# Patient Record
Sex: MALE | Race: WHITE | Employment: OTHER | URBAN - METROPOLITAN AREA
[De-identification: names, ages, dates, MRNs, and addresses within clinical notes are randomized per-mention and may not be internally consistent; named-entity substitution may affect disease eponyms.]

---

## 2021-02-23 ENCOUNTER — APPOINTMENT (OUTPATIENT)
Dept: CT IMAGING | Age: 76
DRG: 138 | End: 2021-02-23
Payer: MEDICARE

## 2021-02-23 ENCOUNTER — HOSPITAL ENCOUNTER (INPATIENT)
Age: 76
LOS: 2 days | Discharge: HOME | DRG: 138 | End: 2021-02-25
Attending: EMERGENCY MEDICINE | Admitting: FAMILY MEDICINE
Payer: MEDICARE

## 2021-02-23 DIAGNOSIS — R55 SYNCOPE AND COLLAPSE: Primary | ICD-10-CM

## 2021-02-23 DIAGNOSIS — S01.511A LIP LACERATION, INITIAL ENCOUNTER: ICD-10-CM

## 2021-02-23 DIAGNOSIS — S00.83XA CONTUSION OF FACE, INITIAL ENCOUNTER: ICD-10-CM

## 2021-02-23 LAB
ALBUMIN SERPL-MCNC: 3.9 G/DL (ref 3.5–5.2)
ALP BLD-CCNC: 42 U/L (ref 40–129)
ALT SERPL-CCNC: 25 U/L (ref 0–40)
ANION GAP SERPL CALCULATED.3IONS-SCNC: 13 MMOL/L (ref 7–16)
AST SERPL-CCNC: 28 U/L (ref 0–39)
BASOPHILS ABSOLUTE: 0.07 E9/L (ref 0–0.2)
BASOPHILS RELATIVE PERCENT: 1.1 % (ref 0–2)
BILIRUB SERPL-MCNC: 0.8 MG/DL (ref 0–1.2)
BUN BLDV-MCNC: 20 MG/DL (ref 8–23)
CALCIUM SERPL-MCNC: 8.6 MG/DL (ref 8.6–10.2)
CHLORIDE BLD-SCNC: 103 MMOL/L (ref 98–107)
CO2: 22 MMOL/L (ref 22–29)
CREAT SERPL-MCNC: 1.2 MG/DL (ref 0.7–1.2)
D DIMER: 272 NG/ML DDU
EKG ATRIAL RATE: 67 BPM
EKG P AXIS: 42 DEGREES
EKG P-R INTERVAL: 196 MS
EKG Q-T INTERVAL: 434 MS
EKG QRS DURATION: 132 MS
EKG QTC CALCULATION (BAZETT): 458 MS
EKG R AXIS: 59 DEGREES
EKG T AXIS: 43 DEGREES
EKG VENTRICULAR RATE: 67 BPM
EOSINOPHILS ABSOLUTE: 0.06 E9/L (ref 0.05–0.5)
EOSINOPHILS RELATIVE PERCENT: 0.9 % (ref 0–6)
GFR AFRICAN AMERICAN: >60
GFR NON-AFRICAN AMERICAN: 59 ML/MIN/1.73
GLUCOSE BLD-MCNC: 149 MG/DL (ref 74–99)
HCT VFR BLD CALC: 39.8 % (ref 37–54)
HEMOGLOBIN: 12.9 G/DL (ref 12.5–16.5)
IMMATURE GRANULOCYTES #: 0.03 E9/L
IMMATURE GRANULOCYTES %: 0.5 % (ref 0–5)
LYMPHOCYTES ABSOLUTE: 1.34 E9/L (ref 1.5–4)
LYMPHOCYTES RELATIVE PERCENT: 21.2 % (ref 20–42)
MAGNESIUM: 1.7 MG/DL (ref 1.6–2.6)
MCH RBC QN AUTO: 30.3 PG (ref 26–35)
MCHC RBC AUTO-ENTMCNC: 32.4 % (ref 32–34.5)
MCV RBC AUTO: 93.4 FL (ref 80–99.9)
MONOCYTES ABSOLUTE: 0.31 E9/L (ref 0.1–0.95)
MONOCYTES RELATIVE PERCENT: 4.9 % (ref 2–12)
NEUTROPHILS ABSOLUTE: 4.51 E9/L (ref 1.8–7.3)
NEUTROPHILS RELATIVE PERCENT: 71.4 % (ref 43–80)
PDW BLD-RTO: 15.8 FL (ref 11.5–15)
PLATELET # BLD: 143 E9/L (ref 130–450)
PMV BLD AUTO: 9.9 FL (ref 7–12)
POTASSIUM SERPL-SCNC: 4.8 MMOL/L (ref 3.5–5)
RBC # BLD: 4.26 E12/L (ref 3.8–5.8)
SODIUM BLD-SCNC: 138 MMOL/L (ref 132–146)
TOTAL PROTEIN: 6.2 G/DL (ref 6.4–8.3)
TROPONIN: <0.01 NG/ML (ref 0–0.03)
WBC # BLD: 6.3 E9/L (ref 4.5–11.5)

## 2021-02-23 PROCEDURE — 99284 EMERGENCY DEPT VISIT MOD MDM: CPT

## 2021-02-23 PROCEDURE — 70486 CT MAXILLOFACIAL W/O DYE: CPT

## 2021-02-23 PROCEDURE — 90471 IMMUNIZATION ADMIN: CPT | Performed by: STUDENT IN AN ORGANIZED HEALTH CARE EDUCATION/TRAINING PROGRAM

## 2021-02-23 PROCEDURE — 0CQ00ZZ REPAIR UPPER LIP, OPEN APPROACH: ICD-10-PCS | Performed by: FAMILY MEDICINE

## 2021-02-23 PROCEDURE — 70450 CT HEAD/BRAIN W/O DYE: CPT

## 2021-02-23 PROCEDURE — 2580000003 HC RX 258: Performed by: RADIOLOGY

## 2021-02-23 PROCEDURE — 83735 ASSAY OF MAGNESIUM: CPT

## 2021-02-23 PROCEDURE — 84484 ASSAY OF TROPONIN QUANT: CPT

## 2021-02-23 PROCEDURE — 85378 FIBRIN DEGRADE SEMIQUANT: CPT

## 2021-02-23 PROCEDURE — 71275 CT ANGIOGRAPHY CHEST: CPT

## 2021-02-23 PROCEDURE — 6370000000 HC RX 637 (ALT 250 FOR IP): Performed by: STUDENT IN AN ORGANIZED HEALTH CARE EDUCATION/TRAINING PROGRAM

## 2021-02-23 PROCEDURE — 93010 ELECTROCARDIOGRAM REPORT: CPT | Performed by: INTERNAL MEDICINE

## 2021-02-23 PROCEDURE — 90715 TDAP VACCINE 7 YRS/> IM: CPT | Performed by: STUDENT IN AN ORGANIZED HEALTH CARE EDUCATION/TRAINING PROGRAM

## 2021-02-23 PROCEDURE — 6360000002 HC RX W HCPCS: Performed by: STUDENT IN AN ORGANIZED HEALTH CARE EDUCATION/TRAINING PROGRAM

## 2021-02-23 PROCEDURE — 85025 COMPLETE CBC W/AUTO DIFF WBC: CPT

## 2021-02-23 PROCEDURE — 6360000004 HC RX CONTRAST MEDICATION: Performed by: RADIOLOGY

## 2021-02-23 PROCEDURE — 93005 ELECTROCARDIOGRAM TRACING: CPT | Performed by: STUDENT IN AN ORGANIZED HEALTH CARE EDUCATION/TRAINING PROGRAM

## 2021-02-23 PROCEDURE — 2140000000 HC CCU INTERMEDIATE R&B

## 2021-02-23 PROCEDURE — 72125 CT NECK SPINE W/O DYE: CPT

## 2021-02-23 PROCEDURE — 80053 COMPREHEN METABOLIC PANEL: CPT

## 2021-02-23 RX ORDER — CEPHALEXIN 500 MG/1
500 CAPSULE ORAL 2 TIMES DAILY
Status: DISCONTINUED | OUTPATIENT
Start: 2021-02-23 | End: 2021-02-25 | Stop reason: HOSPADM

## 2021-02-23 RX ORDER — ATORVASTATIN CALCIUM 40 MG/1
40 TABLET, FILM COATED ORAL DAILY
COMMUNITY

## 2021-02-23 RX ORDER — ATORVASTATIN CALCIUM 10 MG/1
10 TABLET, FILM COATED ORAL NIGHTLY
Status: DISCONTINUED | OUTPATIENT
Start: 2021-02-23 | End: 2021-02-25 | Stop reason: HOSPADM

## 2021-02-23 RX ORDER — ASPIRIN 81 MG/1
81 TABLET ORAL DAILY
COMMUNITY

## 2021-02-23 RX ORDER — NEOMYCIN SULFATE, POLYMYXIN B SULFATE, BACITRACIN ZINC, HYDROCORTISONE 3.5; 10000; 400; 1 MG/G; [USP'U]/G; [USP'U]/G; MG/G
OINTMENT OPHTHALMIC 2 TIMES DAILY
Status: DISCONTINUED | OUTPATIENT
Start: 2021-02-23 | End: 2021-02-25 | Stop reason: HOSPADM

## 2021-02-23 RX ORDER — SODIUM CHLORIDE 0.9 % (FLUSH) 0.9 %
10 SYRINGE (ML) INJECTION ONCE
Status: COMPLETED | OUTPATIENT
Start: 2021-02-23 | End: 2021-02-23

## 2021-02-23 RX ADMIN — IOPAMIDOL 70 ML: 755 INJECTION, SOLUTION INTRAVENOUS at 14:27

## 2021-02-23 RX ADMIN — Medication 10 ML: at 14:28

## 2021-02-23 RX ADMIN — CEPHALEXIN 500 MG: 500 CAPSULE ORAL at 17:42

## 2021-02-23 RX ADMIN — TETANUS TOXOID, REDUCED DIPHTHERIA TOXOID AND ACELLULAR PERTUSSIS VACCINE, ADSORBED 0.5 ML: 5; 2.5; 8; 8; 2.5 SUSPENSION INTRAMUSCULAR at 12:41

## 2021-02-23 ASSESSMENT — PAIN DESCRIPTION - LOCATION: LOCATION: MOUTH

## 2021-02-23 ASSESSMENT — PAIN SCALES - GENERAL
PAINLEVEL_OUTOF10: 0
PAINLEVEL_OUTOF10: 0

## 2021-02-23 NOTE — PROGRESS NOTES
Patient's hearing aid removed and placed in an envelope. Envelope given to patient. Patient left CT department with hearing aid.

## 2021-02-23 NOTE — PROCEDURES
Pedrito Shepard is a 70 y.o. male patient. 1. Syncope and collapse    2. Lip laceration, initial encounter    3. Contusion of face, initial encounter      Past Medical History:   Diagnosis Date    Hyperlipidemia     Prostate cancer ([address]      Blood pressure 106/62, pulse 62, temperature 97.6 °F (36.4 °C), resp. rate 13, SpO2 93 %. Procedures    Laceration Repair Procedure Note    Indication: Lip Laceration    Procedure: The patient was placed in the appropriate position and anesthesia around the laceration was obtained by infiltration using 1% Lidocaine with epinephrine. The area was then cleansed with betadine and draped in a sterile fashion. The laceration was closed with 5-0 fast absorbing gut. There were no additional lacerations requiring repair. Wound extended into the subcutaneous tissue. Minimal debridment was preformed, flaps were aligned. No foreign body was identified. Total repaired wound length: 8 cm. Other Items: Suture count: 3    The patient tolerated the procedure well.     Complications: None    Dr. Baldemar Webster was available for the entire procedure    Urbano Connelly DO  2/23/2021

## 2021-02-23 NOTE — ED NOTES
Bed: 20  Expected date:   Expected time:   Means of arrival:   Comments:     Annamarie Bass RN  02/23/21 3665

## 2021-02-23 NOTE — CONSULTS
GENERAL SURGERY  CONSULT NOTE  2/23/2021    Physician Consulted: Dr. Vijaya Christine  Reason for Consult: Lip Laceration    CC: Syncope    HPI  Dotty Hill is a 70 y.o. male who presents for evaluation after syncopal fall where he hit his upper lip against a computer stand. Patient sustained circular laceration to the superior medial oral labia with an [place] of tissue present. The [place] of tissue appears viable. The wound was not actively hemorrhaging. No debris was appreciated within the wound. Past Medical History:   Diagnosis Date    Hyperlipidemia     Prostate cancer [facility])        History reviewed. No pertinent surgical history. Medications Prior to Admission:    Prior to Admission medications    Medication Sig Start Date End Date Taking? Authorizing Provider   aspirin 81 MG EC tablet Take 81 mg by mouth daily   Yes Historical Provider, MD   atorvastatin (LIPITOR) 40 MG tablet Take 40 mg by mouth daily   Yes Historical Provider, MD       No Known Allergies    History reviewed. No pertinent family history. Social History     Tobacco Use    Smoking status: Never Smoker   Substance Use Topics    Alcohol use: No    Drug use: Not on file         Review of Systems   General ROS: New Syncope  Hematological and Lymphatic ROS: negative for - Home anticoagulation  Respiratory ROS: negative  Cardiovascular ROS: New syncope  Gastrointestinal ROS: negative  Genito-Urinary ROS: negative  Musculoskeletal ROS: negative      PHYSICAL EXAM:    Vitals:    02/23/21 1402   BP: 106/62   Pulse: 62   Resp: 13   Temp:    SpO2: 93%       General Appearance:  awake, alert, oriented, in no acute distress  Skin:  circular laceration to the superior medial oral labia with an [place] of tissue present. The [place] of tissue appears viable. The wound was not actively hemorrhaging. No debris was appreciated within the wound. Head/face:  NCAT  Eyes:  No gross abnormalities.   Lungs:  Breathing Pattern: regular, no distress  Heart: Heart regular rate and hypotensive  Abdomen:  Soft, non-tender,non distended  Extremities: Extremities warm to touch, pink, with no edema. LABS:    CBC  Recent Labs     02/23/21  1232   WBC 6.3   HGB 12.9   HCT 39.8        BMP  Recent Labs     02/23/21  1232      K 4.8      CO2 22   BUN 20   CREATININE 1.2   CALCIUM 8.6     Liver Function  Recent Labs     02/23/21  1232   BILITOT 0.8   AST 28   ALT 25   ALKPHOS 42   PROT 6.2*   LABALBU 3.9     No results for input(s): LACTATE in the last 72 hours. No results for input(s): INR, PTT in the last 72 hours. Invalid input(s): PT    RADIOLOGY    Ct Head Wo Contrast    Result Date: 2/23/2021  EXAMINATION: CT OF THE HEAD WITHOUT CONTRAST  2/23/2021 2:25 pm TECHNIQUE: CT of the head was performed without the administration of intravenous contrast. Dose modulation, iterative reconstruction, and/or weight based adjustment of the mA/kV was utilized to reduce the radiation dose to as low as reasonably achievable. COMPARISON: None. HISTORY: ORDERING SYSTEM PROVIDED HISTORY: fall TECHNOLOGIST PROVIDED HISTORY: Reason for exam:->fall Has a \"code stroke\" or \"stroke alert\" been called? ->No Decision Support Exception->Emergency Medical Condition ([place]) What reading provider will be dictating this exam?->CRC FINDINGS: BRAIN/VENTRICLES: There is no acute intracranial hemorrhage, mass effect or midline shift. No abnormal extra-axial fluid collection. The gray-white differentiation is maintained without evidence of an acute infarct. There is no evidence of hydrocephalus. The ventricles, cisterns and sulci are prominent consistent with atrophy. There is decreased attenuation within the periventricular white matter consistent with periventricular leukomalacia. ORBITS: The visualized portion of the orbits demonstrate no acute abnormality. SINUSES: The visualized paranasal sinuses and mastoid air cells demonstrate no acute abnormality.  SOFT TISSUES/SKULL:  No acute abnormality of the visualized skull or soft tissues. 1. There is no acute intracranial abnormality. Specifically, there is no intracranial hemorrhage. 2. Atrophy and periventricular leukomalacia,    Ct Facial Bones Wo Contrast    Result Date: 2/23/2021  EXAMINATION: CT OF THE FACE WITHOUT CONTRAST  2/23/2021 2:25 pm TECHNIQUE: CT of the face was performed without the administration of intravenous contrast. Multiplanar reformatted images are provided for review. Dose modulation, iterative reconstruction, and/or weight based adjustment of the mA/kV was utilized to reduce the radiation dose to as low as reasonably achievable. COMPARISON: None HISTORY: ORDERING SYSTEM PROVIDED HISTORY: fall TECHNOLOGIST PROVIDED HISTORY: Reason for exam:->fall Decision Support Exception->Emergency Medical Condition (MA) What reading provider will be dictating this exam?->CRC FINDINGS: FACIAL BONES:  The maxilla, pterygoid plates and zygomatic arches are intact. The mandible is intact. The mandibular condyles are normally situated. The nasal bones and maxillary nasal processes are intact. ORBITS:  The globes appear intact. The extraocular muscles, optic nerve sheath complexes and lacrimal glands appear unremarkable. No retrobulbar hematoma or mass is seen. The orbital walls and rims are intact. SINUSES/MASTOIDS:  The paranasal sinuses and mastoid air cells are well aerated. No acute fracture is seen. SOFT TISSUES:  No appreciable facial soft tissue swelling is seen. No acute traumatic injury of the facial bones. Ct Cervical Spine Wo Contrast    Result Date: 2/23/2021  EXAMINATION: CT OF THE CERVICAL SPINE WITHOUT CONTRAST 2/23/2021 2:25 pm TECHNIQUE: CT of the cervical spine was performed without the administration of intravenous contrast. Multiplanar reformatted images are provided for review.  Dose modulation, iterative reconstruction, and/or weight based adjustment of the mA/kV was utilized to reduce the radiation dose to as low as reasonably achievable. COMPARISON: None. HISTORY: ORDERING SYSTEM PROVIDED HISTORY: fall TECHNOLOGIST PROVIDED HISTORY: Reason for exam:->fall Decision Support Exception->Emergency Medical Condition (MA) What reading provider will be dictating this exam?->CRC FINDINGS: BONES/ALIGNMENT: The ring of C1 is intact as is the dense. There is no compression fracture of the cervical spine. No jumped or perched facet is noted. Multilevel degenerative disc and degenerative joint disease is noted. The prevertebral soft tissues are unremarkable. The airway is widely patent. Images through the lung apices are negative for a pneumothorax. 1. There is no acute compression fracture or subluxation of the cervical spine. 2. Multilevel degenerative disc and degenerative joint disease. Cta Pulmonary W Contrast    Result Date: 2/23/2021  EXAMINATION: CTA OF THE CHEST 2/23/2021 2:25 pm TECHNIQUE: CTA of the chest was performed after the administration of intravenous contrast.  Multiplanar reformatted images are provided for review. MIP images are provided for review. Dose modulation, iterative reconstruction, and/or weight based adjustment of the mA/kV was utilized to reduce the radiation dose to as low as reasonably achievable. COMPARISON: None. HISTORY: ORDERING SYSTEM PROVIDED HISTORY: concern for pe TECHNOLOGIST PROVIDED HISTORY: Reason for exam:->concern for pe Decision Support Exception->Emergency Medical Condition (MA) What reading provider will be dictating this exam?->CRC FINDINGS: Pulmonary Arteries: Pulmonary arteries are adequately opacified for evaluation. No evidence of intraluminal filling defect to suggest pulmonary embolism. Main pulmonary artery is normal in caliber. Mediastinum: No evidence of mediastinal lymphadenopathy. There is a small calcified right and left hilar nodes. The heart and pericardium demonstrate no acute abnormality. There is no acute abnormality of the thoracic aorta. Lungs/pleura: There is a small amount of bibasilar atelectasis. There is no focal consolidation or pulmonary edema. No evidence of pleural effusion or pneumothorax. Upper Abdomen: Limited images of the upper abdomen are unremarkable. Soft Tissues/Bones: No acute bone or soft tissue abnormality. No evidence of pulmonary embolism or significant acute pulmonary abnormality.         ASSESSMENT:  70 y.o. male with superior labial laceration involving the vermilion border    PLAN:    -Primary repair with 5-0 Fast absorbing gut  -Keflex 5 days  -Outpatient follow up with Dr. Aaliyah Thompson this next Wednesday 3/3/21    Plan discussed with OMID Bledsoe    Electronically signed by Love Davis DO on 2/23/21 at 5:06 PM EST

## 2021-02-23 NOTE — ED PROVIDER NOTES
HPI:     Silvia Villafuerte is a 70 y.o. male presenting to the ED for consciousness, beginning 2 hours ago. The complaint has been intermittent, moderate in severity, and worsened by nothing. Patient presents the ER today after a syncopal event at home. Patient states that he was walking to the bathroom when he was consciousness. He is unsure as to why or how. States that he woke up on the floor. States that he did hit the front of his face and cut his lip. Per EMS, patient was hypotensive and bradycardic with a rapid heart rate in the 30s. Did give some atropine which did increase patient's heart rate. At this time, patient states that he feels his normal self. Denies any headache, dizziness, fever, chest pain, cough, nausea, vomiting, abdominal pain, diarrhea, constipation, urinary symptoms. Review of Systems:   Please see HPI above. All bolded are positive. All un-bolded are negative.     Constitutional Symptoms: fever, chills, fatigue, generalized weakness, diaphoresis, increase in thirst, loss of appetite  Eyes: vision change   Ears, Nose, Mouth, Throat: hearing loss, nasal congestion, sores in the mouth, lip laceration, facial contusion  Cardiovascular: chest pain, chest heaviness, palpitations  Respiratory: shortness of breath, wheezing, coughing  Gastrointestinal: abdominal pain, nausea, vomiting, diarrhea, constipation, melena, hematochezia, hematemesis  Genitourinary: dysuria, hematuria, increased frequency  Musculoskeletal: lower extremity edema, myalgias, arthralgias, back pain  Integumentary: rashes, itching   Neurological: headache, lightheadedness, dizziness, confusion, syncope, numbness, tingling, focal weakness  Psychiatric: depression, suicidal ideation, anxiety  Endocrine: unintentional weight change  Hematologic/Lymphatic: lymphadenopathy, easy bruising, easy bleeding   Allergic/Immunologic: recurrent infections            --------------------------------------------- PAST HISTORY ---------------------------------------------  Past Medical History:  has a past medical history of Hyperlipidemia and Prostate cancer ([facility] [address] Past Surgical History:  has no past surgical history on file. Social History:  reports that he has never smoked. He does not have any smokeless tobacco history on file. He reports that he does not drink alcohol. Family History: family history is not on file. The patients home medications have been reviewed. Allergies: Patient has no known allergies.     -------------------------------------------------- RESULTS -------------------------------------------------  All laboratory and radiology results have been personally reviewed by myself   LABS:  Results for orders placed or performed during the hospital encounter of 02/23/21   CBC auto differential   Result Value Ref Range    WBC 6.3 4.5 - 11.5 E9/L    RBC 4.26 3.80 - 5.80 E12/L    Hemoglobin 12.9 12.5 - 16.5 g/dL    Hematocrit 39.8 37.0 - 54.0 %    MCV 93.4 80.0 - 99.9 fL    MCH 30.3 26.0 - 35.0 pg    MCHC 32.4 32.0 - 34.5 %    RDW 15.8 (H) 11.5 - 15.0 fL    Platelets 688 669 - 392 E9/L    MPV 9.9 7.0 - 12.0 fL    Neutrophils % 71.4 43.0 - 80.0 %    Immature Granulocytes % 0.5 0.0 - 5.0 %    Lymphocytes % 21.2 20.0 - 42.0 %    Monocytes % 4.9 2.0 - 12.0 %    Eosinophils % 0.9 0.0 - 6.0 %    Basophils % 1.1 0.0 - 2.0 %    Neutrophils Absolute 4.51 1.80 - 7.30 E9/L    Immature Granulocytes # 0.03 E9/L    Lymphocytes Absolute 1.34 (L) 1.50 - 4.00 E9/L    Monocytes Absolute 0.31 0.10 - 0.95 E9/L    Eosinophils Absolute 0.06 0.05 - 0.50 E9/L    Basophils Absolute 0.07 0.00 - 0.20 E9/L   Comprehensive Metabolic Panel   Result Value Ref Range    Sodium 138 132 - 146 mmol/L    Potassium 4.8 3.5 - 5.0 mmol/L    Chloride 103 98 - 107 mmol/L    CO2 22 22 - 29 mmol/L    Anion Gap 13 7 - 16 mmol/L    Glucose 149 (H) 74 - 99 mg/dL    BUN 20 8 - 23 mg/dL    CREATININE 1.2 0.7 - 1.2 mg/dL    GFR Non-African American 59 >=60 mL/min/1.73    GFR African American >60     Calcium 8.6 8.6 - 10.2 mg/dL    Total Protein 6.2 (L) 6.4 - 8.3 g/dL    Albumin 3.9 3.5 - 5.2 g/dL    Total Bilirubin 0.8 0.0 - 1.2 mg/dL    Alkaline Phosphatase 42 40 - 129 U/L    ALT 25 0 - 40 U/L    AST 28 0 - 39 U/L   Troponin   Result Value Ref Range    Troponin <0.01 0.00 - 0.03 ng/mL   D-Dimer, Quantitative   Result Value Ref Range    D-Dimer, Quant 272 ng/mL DDU   Magnesium   Result Value Ref Range    Magnesium 1.7 1.6 - 2.6 mg/dL   EKG 12 Lead   Result Value Ref Range    Ventricular Rate 67 BPM    Atrial Rate 67 BPM    P-R Interval 196 ms    QRS Duration 132 ms    Q-T Interval 434 ms    QTc Calculation (Bazett) 458 ms    P Axis 42 degrees    R Axis 59 degrees    T Axis 43 degrees       RADIOLOGY:  Interpreted by Radiologist.  CT HEAD WO CONTRAST   Final Result   1. There is no acute intracranial abnormality. Specifically, there is no   intracranial hemorrhage. 2. Atrophy and periventricular leukomalacia,      CT FACIAL BONES WO CONTRAST   Final Result   No acute traumatic injury of the facial bones. CT CERVICAL SPINE WO CONTRAST   Final Result   1. There is no acute compression fracture or subluxation of the cervical   spine. 2. Multilevel degenerative disc and degenerative joint disease. CTA PULMONARY W CONTRAST   Final Result   No evidence of pulmonary embolism or significant acute pulmonary abnormality.          ------------------------- NURSING NOTES AND VITALS REVIEWED ---------------------------   The nursing notes within the ED encounter and vital signs as below have been reviewed.    /62   Pulse 62   Temp 97.6 °F (36.4 °C)   Resp 13   SpO2 93%   Oxygen Saturation Interpretation: Normal      ---------------------------------------------------PHYSICAL EXAM--------------------------------------      Constitutional/General: Alert and oriented x3, well appearing, non toxic in NAD  Head: Normocephalic and atraumatic, small abrasion noted to patient's right cheek  Eyes: PERRL, EOMI  Mouth: Oropharynx clear, handling secretions, no trismus, slight laceration noted through frenulum and through superior vermilion border of the lip  Neck: Supple, full ROM, phonation normal  Pulmonary: Lungs clear to auscultation bilaterally, no wheezes, rales, or rhonchi. Not in respiratory distress  Cardiovascular:  Regular rate and rhythm, no murmurs, gallops, or rubs. 2+ distal pulses  Abdomen: Soft, non tender, non distended,   Extremities: Moves all extremities x 4. Warm and well perfused  Skin: warm and dry without rash  Neurologic: GCS 15, no focal deficit noted  Psych: Normal Affect        EKG: This EKG is signed and interpreted by me. Rate: 67  Rhythm: Sinus  Interpretation: Normal sinus pain, right bundle branch block, no acute changes, no axis deviation  Comparison: no previous EKG    ------------------------------ ED COURSE/MEDICAL DECISION MAKING----------------------  Medications   Tetanus-Diphth-Acell Pertussis (BOOSTRIX) injection 0.5 mL (0.5 mLs Intramuscular Given 2/23/21 1241)   sodium chloride flush 0.9 % injection 10 mL (10 mLs Intravenous Given 2/23/21 1428)   iopamidol (ISOVUE-370) 76 % injection 70 mL (70 mLs Intravenous Given 2/23/21 1427)         ED COURSE:       Medical Decision Making:    Patient presented to the ER today after a fall. Patient is longer hypotensive in the ER. Patient does have a laceration to the vermilion border and I did speak to plastic surgery. At this time, patient is well-appearing, however given that he had a syncopal event and was hypotensive and bradycardic prior to arrival I will have admitted for further evaluation. I did speak to Dr. Kailee Jones who is agreeable to mission. Counseling: The emergency provider has spoken with the patient and discussed todays results, in addition to providing specific details for the plan of care and counseling regarding the diagnosis and prognosis.   Questions are answered at

## 2021-02-24 LAB
ANION GAP SERPL CALCULATED.3IONS-SCNC: 7 MMOL/L (ref 7–16)
BUN BLDV-MCNC: 15 MG/DL (ref 8–23)
CALCIUM SERPL-MCNC: 8.8 MG/DL (ref 8.6–10.2)
CHLORIDE BLD-SCNC: 104 MMOL/L (ref 98–107)
CO2: 29 MMOL/L (ref 22–29)
CREAT SERPL-MCNC: 1.2 MG/DL (ref 0.7–1.2)
GFR AFRICAN AMERICAN: >60
GFR NON-AFRICAN AMERICAN: 59 ML/MIN/1.73
GLUCOSE BLD-MCNC: 107 MG/DL (ref 74–99)
HCT VFR BLD CALC: 39.5 % (ref 37–54)
HEMOGLOBIN: 13.2 G/DL (ref 12.5–16.5)
MCH RBC QN AUTO: 31.2 PG (ref 26–35)
MCHC RBC AUTO-ENTMCNC: 33.4 % (ref 32–34.5)
MCV RBC AUTO: 93.4 FL (ref 80–99.9)
PDW BLD-RTO: 16 FL (ref 11.5–15)
PLATELET # BLD: 143 E9/L (ref 130–450)
PMV BLD AUTO: 9.7 FL (ref 7–12)
POTASSIUM SERPL-SCNC: 4.4 MMOL/L (ref 3.5–5)
RBC # BLD: 4.23 E12/L (ref 3.8–5.8)
SODIUM BLD-SCNC: 140 MMOL/L (ref 132–146)
WBC # BLD: 6.6 E9/L (ref 4.5–11.5)

## 2021-02-24 PROCEDURE — 2140000000 HC CCU INTERMEDIATE R&B

## 2021-02-24 PROCEDURE — 99223 1ST HOSP IP/OBS HIGH 75: CPT | Performed by: SPECIALIST

## 2021-02-24 PROCEDURE — 36415 COLL VENOUS BLD VENIPUNCTURE: CPT

## 2021-02-24 PROCEDURE — 80048 BASIC METABOLIC PNL TOTAL CA: CPT

## 2021-02-24 PROCEDURE — 6370000000 HC RX 637 (ALT 250 FOR IP): Performed by: STUDENT IN AN ORGANIZED HEALTH CARE EDUCATION/TRAINING PROGRAM

## 2021-02-24 PROCEDURE — 6360000002 HC RX W HCPCS: Performed by: FAMILY MEDICINE

## 2021-02-24 PROCEDURE — 85027 COMPLETE CBC AUTOMATED: CPT

## 2021-02-24 RX ADMIN — ENOXAPARIN SODIUM 40 MG: 40 INJECTION SUBCUTANEOUS at 08:22

## 2021-02-24 RX ADMIN — CEPHALEXIN 500 MG: 500 CAPSULE ORAL at 08:22

## 2021-02-24 RX ADMIN — NEOMYCIN SULFATE AND POLYMYXIN B SULFATE, BACITRACIN ZINC AND HYDROCORTISONE: 3.5; 10000; 400; 1 OINTMENT OPHTHALMIC at 20:26

## 2021-02-24 RX ADMIN — CEPHALEXIN 500 MG: 500 CAPSULE ORAL at 20:25

## 2021-02-24 RX ADMIN — NEOMYCIN SULFATE AND POLYMYXIN B SULFATE, BACITRACIN ZINC AND HYDROCORTISONE: 3.5; 10000; 400; 1 OINTMENT OPHTHALMIC at 11:51

## 2021-02-24 ASSESSMENT — PAIN SCALES - GENERAL
PAINLEVEL_OUTOF10: 0

## 2021-02-24 ASSESSMENT — ENCOUNTER SYMPTOMS
SHORTNESS OF BREATH: 0
ABDOMINAL PAIN: 0

## 2021-02-24 NOTE — CARE COORDINATION
Transition of care: EP cardio consulted - 2d echo and stress test ordered. Plastic surgery consulted for lip laceration. Met with pt and pt's wife, Eyal Gonzalez, in room. Pt lives with his wife in a 2 story home. Independent with ADLs and drives. No DME. Not a . Discharge plan is to return home. No needs anticipated. PCP is Dr. Jean Marie Orozco and pharmacy is [facility] in [facility].  Sw/cm will follow

## 2021-02-24 NOTE — H&P
HISTORY AND PHYSICAL             Date: 2/24/2021        Patient Name: Dotty Hill     YOB: 1945      Age:  70 y.o. Chief Complaint     Chief Complaint   Patient presents with    Loss of Consciousness     syncopal episode at home, hit head and upper lip, pts HR in 30s for EMS with hypotension, 1mg of atropine, 4mg zofran given pta, laceration to upper lip          History Obtained From   patient    History of Present Illness   Patient says he was standing in the bathroom and then lost consciousness and fell to the floor hitting his face. He says he feels fine today. Past Medical History     Past Medical History:   Diagnosis Date    Hyperlipidemia     Prostate cancer [facility])         Past Surgical History   History reviewed. No pertinent surgical history. Medications Prior to Admission     Prior to Admission medications    Medication Sig Start Date End Date Taking? Authorizing Provider   aspirin 81 MG EC tablet Take 81 mg by mouth daily   Yes Historical Provider, MD   atorvastatin (LIPITOR) 40 MG tablet Take 40 mg by mouth daily   Yes Historical Provider, MD        Allergies   Patient has no known allergies. Social History     Social History     Tobacco History     Smoking Status  Never Smoker    Smokeless Tobacco Use  Unknown          Alcohol History     Alcohol Use Status  No          Drug Use     Drug Use Status  Not Asked          Sexual Activity     Sexually Active  Not Asked                Family History   History reviewed. No pertinent family history. Review of Systems   Review of Systems   Constitutional: Positive for activity change. HENT: Negative for congestion. Respiratory: Negative for shortness of breath. Cardiovascular: Negative for chest pain. Gastrointestinal: Negative for abdominal pain. Genitourinary: Negative for difficulty urinating. Musculoskeletal: Negative for arthralgias. Neurological: Positive for syncope.    Hematological: Negative for adenopathy. Psychiatric/Behavioral: Negative for agitation. Physical Exam   /65   Pulse 61   Temp 98.2 °F (36.8 °C) (Temporal)   Resp 16   Ht 5' 10\" (1.778 m)   Wt 201 lb 9.6 oz (91.4 kg)   SpO2 96%   BMI 28.93 kg/m²     Physical Exam  Vitals signs reviewed. HENT:      Head:      Comments: Laceration upper lip     Mouth/Throat:      Mouth: Mucous membranes are moist.   Eyes:      Pupils: Pupils are equal, round, and reactive to light. Cardiovascular:      Rate and Rhythm: Normal rate and regular rhythm. Pulses: Normal pulses. Heart sounds: Normal heart sounds. Pulmonary:      Effort: Pulmonary effort is normal. No respiratory distress. Breath sounds: Normal breath sounds. No wheezing. Abdominal:      General: Abdomen is flat. Bowel sounds are normal. There is no distension. Tenderness: There is no abdominal tenderness. Musculoskeletal: Normal range of motion. Skin:     General: Skin is warm and dry. Capillary Refill: Capillary refill takes less than 2 seconds. Neurological:      General: No focal deficit present. Mental Status: He is alert and oriented to person, place, and time.    Psychiatric:         Mood and Affect: Mood normal.         Behavior: Behavior normal.         Labs      Recent Results (from the past 24 hour(s))   EKG 12 Lead    Collection Time: 02/23/21 12:28 PM   Result Value Ref Range    Ventricular Rate 67 BPM    Atrial Rate 67 BPM    P-R Interval 196 ms    QRS Duration 132 ms    Q-T Interval 434 ms    QTc Calculation (Bazett) 458 ms    P Axis 42 degrees    R Axis 59 degrees    T Axis 43 degrees   CBC auto differential    Collection Time: 02/23/21 12:32 PM   Result Value Ref Range    WBC 6.3 4.5 - 11.5 E9/L    RBC 4.26 3.80 - 5.80 E12/L    Hemoglobin 12.9 12.5 - 16.5 g/dL    Hematocrit 39.8 37.0 - 54.0 %    MCV 93.4 80.0 - 99.9 fL    MCH 30.3 26.0 - 35.0 pg    MCHC 32.4 32.0 - 34.5 %    RDW 15.8 (H) 11.5 - 15.0 fL    Platelets 690 924 - 450 E9/L    MPV 9.9 7.0 - 12.0 fL    Neutrophils % 71.4 43.0 - 80.0 %    Immature Granulocytes % 0.5 0.0 - 5.0 %    Lymphocytes % 21.2 20.0 - 42.0 %    Monocytes % 4.9 2.0 - 12.0 %    Eosinophils % 0.9 0.0 - 6.0 %    Basophils % 1.1 0.0 - 2.0 %    Neutrophils Absolute 4.51 1.80 - 7.30 E9/L    Immature Granulocytes # 0.03 E9/L    Lymphocytes Absolute 1.34 (L) 1.50 - 4.00 E9/L    Monocytes Absolute 0.31 0.10 - 0.95 E9/L    Eosinophils Absolute 0.06 0.05 - 0.50 E9/L    Basophils Absolute 0.07 0.00 - 0.20 E9/L   Comprehensive Metabolic Panel    Collection Time: 02/23/21 12:32 PM   Result Value Ref Range    Sodium 138 132 - 146 mmol/L    Potassium 4.8 3.5 - 5.0 mmol/L    Chloride 103 98 - 107 mmol/L    CO2 22 22 - 29 mmol/L    Anion Gap 13 7 - 16 mmol/L    Glucose 149 (H) 74 - 99 mg/dL    BUN 20 8 - 23 mg/dL    CREATININE 1.2 0.7 - 1.2 mg/dL    GFR Non-African American 59 >=60 mL/min/1.73    GFR African American >60     Calcium 8.6 8.6 - 10.2 mg/dL    Total Protein 6.2 (L) 6.4 - 8.3 g/dL    Albumin 3.9 3.5 - 5.2 g/dL    Total Bilirubin 0.8 0.0 - 1.2 mg/dL    Alkaline Phosphatase 42 40 - 129 U/L    ALT 25 0 - 40 U/L    AST 28 0 - 39 U/L   Troponin    Collection Time: 02/23/21 12:32 PM   Result Value Ref Range    Troponin <0.01 0.00 - 0.03 ng/mL   D-Dimer, Quantitative    Collection Time: 02/23/21 12:32 PM   Result Value Ref Range    D-Dimer, Quant 272 ng/mL DDU   Magnesium    Collection Time: 02/23/21 12:32 PM   Result Value Ref Range    Magnesium 1.7 1.6 - 2.6 mg/dL        Imaging/Diagnostics Last 24 Hours   Ct Head Wo Contrast    Result Date: 2/23/2021  EXAMINATION: CT OF THE HEAD WITHOUT CONTRAST  2/23/2021 2:25 pm TECHNIQUE: CT of the head was performed without the administration of intravenous contrast. Dose modulation, iterative reconstruction, and/or weight based adjustment of the mA/kV was utilized to reduce the radiation dose to as low as reasonably achievable. COMPARISON: None.  HISTORY: ORDERING SYSTEM PROVIDED HISTORY: fall TECHNOLOGIST PROVIDED HISTORY: Reason for exam:->fall Has a \"code stroke\" or \"stroke alert\" been called? ->No Decision Support Exception->Emergency Medical Condition (MA) What reading provider will be dictating this exam?->CRC FINDINGS: BRAIN/VENTRICLES: There is no acute intracranial hemorrhage, mass effect or midline shift. No abnormal extra-axial fluid collection. The gray-white differentiation is maintained without evidence of an acute infarct. There is no evidence of hydrocephalus. The ventricles, cisterns and sulci are prominent consistent with atrophy. There is decreased attenuation within the periventricular white matter consistent with periventricular leukomalacia. ORBITS: The visualized portion of the orbits demonstrate no acute abnormality. SINUSES: The visualized paranasal sinuses and mastoid air cells demonstrate no acute abnormality. SOFT TISSUES/SKULL:  No acute abnormality of the visualized skull or soft tissues. 1. There is no acute intracranial abnormality. Specifically, there is no intracranial hemorrhage. 2. Atrophy and periventricular leukomalacia,    Ct Facial Bones Wo Contrast    Result Date: 2/23/2021  EXAMINATION: CT OF THE FACE WITHOUT CONTRAST  2/23/2021 2:25 pm TECHNIQUE: CT of the face was performed without the administration of intravenous contrast. Multiplanar reformatted images are provided for review. Dose modulation, iterative reconstruction, and/or weight based adjustment of the mA/kV was utilized to reduce the radiation dose to as low as reasonably achievable. COMPARISON: None HISTORY: ORDERING SYSTEM PROVIDED HISTORY: fall TECHNOLOGIST PROVIDED HISTORY: Reason for exam:->fall Decision Support Exception->Emergency Medical Condition (MA) What reading provider will be dictating this exam?->CRC FINDINGS: FACIAL BONES:  The maxilla, pterygoid plates and zygomatic arches are intact. The mandible is intact. The mandibular condyles are normally situated. The nasal bones and maxillary nasal processes are intact. ORBITS:  The globes appear intact. The extraocular muscles, optic nerve sheath complexes and lacrimal glands appear unremarkable. No retrobulbar hematoma or mass is seen. The orbital walls and rims are intact. SINUSES/MASTOIDS:  The paranasal sinuses and mastoid air cells are well aerated. No acute fracture is seen. SOFT TISSUES:  No appreciable facial soft tissue swelling is seen. No acute traumatic injury of the facial bones. Ct Cervical Spine Wo Contrast    Result Date: 2/23/2021  EXAMINATION: CT OF THE CERVICAL SPINE WITHOUT CONTRAST 2/23/2021 2:25 pm TECHNIQUE: CT of the cervical spine was performed without the administration of intravenous contrast. Multiplanar reformatted images are provided for review. Dose modulation, iterative reconstruction, and/or weight based adjustment of the mA/kV was utilized to reduce the radiation dose to as low as reasonably achievable. COMPARISON: None. HISTORY: ORDERING SYSTEM PROVIDED HISTORY: fall TECHNOLOGIST PROVIDED HISTORY: Reason for exam:->fall Decision Support Exception->Emergency Medical Condition (MA) What reading provider will be dictating this exam?->CRC FINDINGS: BONES/ALIGNMENT: The ring of C1 is intact as is the dense. There is no compression fracture of the cervical spine. No jumped or perched facet is noted. Multilevel degenerative disc and degenerative joint disease is noted. The prevertebral soft tissues are unremarkable. The airway is widely patent. Images through the lung apices are negative for a pneumothorax. 1. There is no acute compression fracture or subluxation of the cervical spine. 2. Multilevel degenerative disc and degenerative joint disease.     Cta Pulmonary W Contrast    Result Date: 2/23/2021  EXAMINATION: CTA OF THE CHEST 2/23/2021 2:25 pm TECHNIQUE: CTA of the chest was performed after the administration of intravenous contrast.  Multiplanar reformatted images are provided for review. MIP images are provided for review. Dose modulation, iterative reconstruction, and/or weight based adjustment of the mA/kV was utilized to reduce the radiation dose to as low as reasonably achievable. COMPARISON: None. HISTORY: ORDERING SYSTEM PROVIDED HISTORY: concern for pe TECHNOLOGIST PROVIDED HISTORY: Reason for exam:->concern for pe Decision Support Exception->Emergency Medical Condition (MA) What reading provider will be dictating this exam?->CRC FINDINGS: Pulmonary Arteries: Pulmonary arteries are adequately opacified for evaluation. No evidence of intraluminal filling defect to suggest pulmonary embolism. Main pulmonary artery is normal in caliber. Mediastinum: No evidence of mediastinal lymphadenopathy. There is a small calcified right and left hilar nodes. The heart and pericardium demonstrate no acute abnormality. There is no acute abnormality of the thoracic aorta. Lungs/pleura: There is a small amount of bibasilar atelectasis. There is no focal consolidation or pulmonary edema. No evidence of pleural effusion or pneumothorax. Upper Abdomen: Limited images of the upper abdomen are unremarkable. Soft Tissues/Bones: No acute bone or soft tissue abnormality. No evidence of pulmonary embolism or significant acute pulmonary abnormality. Assessment      Hospital Problems           Last Modified POA    Syncope and collapse 2/23/2021 Yes      lip laceration  Hyperlipidemia    Plan   Cardiology to evaluate. Plastics repaired lip. Will monitor.     Consultations Ordered:  IP CONSULT TO PLASTIC SURGERY  IP CONSULT TO INTERNAL MEDICINE  IP CONSULT TO ELECTROPHYSIOLOGY    Electronically signed by Alba Partida MD on 2/24/21 at 6:25 AM EST

## 2021-02-24 NOTE — ADT AUTH CERT
Utilization Reviews       Syncope - Care Day 2 (2/24/2021) by Ghazala Eden RN       Review Status Review Entered   Completed 2/24/2021 15:25      Criteria Review      Care Day: 2 Care Date: 2/24/2021 Level of Care: Intermediate Care    Guideline Day 2    Clinical Status    ( ) * Hemodynamic stability    2/24/2021 3:25 PM EST by Philip Erazo      BP-123/58, HR-60    ( ) * No acute cardiac or neurologic events    ( ) * Mental status at baseline    ( ) * Dangerous arrhythmia absent    ( ) * No further syncope    ( ) * No evidence of etiology requiring ongoing inpatient care or monitoring (eg, unstable cardiac rhythm or conduction defect)    ( ) * Discharge plans and education understood    Activity    ( ) * Ambulatory or acceptable for next level of care    2/24/2021 3:25 PM EST by Philip Erazo      Bedrest    Routes    (X) * Oral hydration, medications, and diet    2/24/2021 3:25 PM EST by Philip Erazo      NPO  cephALEXin (KEFLEX) capsule 500 mg po BID  enoxaparin (LOVENOX) injection 40 mg daily    Interventions    (X) Possible repeat ECG    (X) Possible cardiac monitoring    2/24/2021 3:25 PM EST by Philip Erazo      Beta Blocker Management Prior to Cardiac Stress Test  NM Cardiac Stress Test Nuclear Imaging    Medications    (X) Possible medication to treat underlying etiology, or analgesics for injury due to syncope    2/24/2021 3:25 PM EST by Philip Erazo      ECHO    * Milestone   Additional Notes   2/24 CD 2      Electrophysiology   Consults   Addendum   Date of Service:  2/24/2021 10:32 AM      CARDIAC ELECTROPHYSIOLOGY CONSULTATION       DATE OF SERVICE:  2/24/2021   CONSULTING ELECTROPHYSIOLOGIST: Eileen Gross   PHYSICIAN REQUESTING CONSULTATION: Margaret Cavazos   REASON FOR CONSULTATION: Syncope       SYNOPSIS OF RELEVANT MEDICAL ISSUES   1. Dysautonomia, with syncopal and near-syncopal events since childhood (low burden). 2. Sinus node somnolence: not severe.  Likely asymptomatic. 3. Right bundle branch block. Normal frontal plane axis. 4. Borderline first degree AV block. 5. Atrial ectopy: telemetry thus far includes brief episodes of ectopic tachycardia, low burden. 6. Ventricular ectopy: telemetry thus far demonstrates single ectopic beats, low burden. 7. Index syncopal event: soon after orthostatic challenge. Incurred injury. My guess is neurogenic (eg. As opposed to primary dysrhythmic). 8. No worrisome cardiovascular symptoms.        RECOMMENDATIONS   1. Echocardiogram (ordered). 2. Maximal treadmill \"stress\" test (ordered). 3. Continued telemetry monitoring.                  [facility] Inpatient Recommendation Letter by Javon Rodriguez RN       Review Status Review Entered   In Primary 2021 15:22      Criteria Review   2021 1443 Physician Ad slr keep in We recommend that the following pt's current hospitalization under [facility], [address] [place] [address] keep in    We recommend that the following pt's current hospitalization under INPATIENT status remain INPATIENT. Name: Kendra Randall   : 1945   CSN: [id number]   INSURANCE: [employer]  21      Clinical summary 71 y/o gentleman with a PMH significant for dyslipidemia and Prostate Cancer admitted with acute syncope and collapse, bradycardia and lip laceration necessitating management including with monitoring for dysrhythmias and ischemia, neurovascular checks, orthostatic readings, hydration, input/output assessments, follow up labs, antibiotics, wound care, Cardiology and Electrophysiology consults. Vitals /65 HR 65/min. Labs and Imaging EKG: Sinus rhythm @ 67/min. MCG criteria applies Yes  Comments Patient's presentation, findings and management meet INPATIENT status criteria.       This chart was reviewed at 2:29 PM 2021    [facility] [id number] [employer]   CELL: [phone]  ________________________________________________________

## 2021-02-24 NOTE — CONSULTS
CARDIAC ELECTROPHYSIOLOGY CONSULTATION    PATIENT: Jude Lazo  MEDICAL RECORD NUMBER: [id number]  DATE OF SERVICE:  2/24/2021  CONSULTING ELECTROPHYSIOLOGIST: Mandeep Sifuentes  PHYSICIAN REQUESTING CONSULTATION: Keegan Hsu  REASON FOR CONSULTATION: Syncope    SYNOPSIS OF RELEVANT MEDICAL ISSUES  1. Dysautonomia, with syncopal and near-syncopal events since childhood (low burden). 2. Sinus node somnolence: not severe. Likely asymptomatic. 3. Right bundle branch block. Normal frontal plane axis. 4. Borderline first degree AV block. 5. Atrial ectopy: telemetry thus far includes brief episodes of ectopic tachycardia, low burden. 6. Ventricular ectopy: telemetry thus far demonstrates single ectopic beats, low burden. 7. Index syncopal event: soon after orthostatic challenge. Incurred injury. My guess is neurogenic (eg. As opposed to primary dysrhythmic). 8. No worrisome cardiovascular symptoms. RECOMMENDATIONS  1. Echocardiogram (ordered). 2. Maximal treadmill \"stress\" test (ordered). 3. Continued telemetry monitoring. We will follow. Mandeep Sifuentes MD, [facility]  Cardiac Electrophysiology  [phone]  10:32 AM  2/24/2021    I spent 70 minutes providing care for this patient, >50% of which was dedicated to counseling or care coordination.

## 2021-02-25 ENCOUNTER — APPOINTMENT (OUTPATIENT)
Dept: NUCLEAR MEDICINE | Age: 76
DRG: 138 | End: 2021-02-25
Payer: MEDICARE

## 2021-02-25 VITALS
RESPIRATION RATE: 18 BRPM | HEIGHT: 70 IN | WEIGHT: 201.6 LBS | HEART RATE: 70 BPM | OXYGEN SATURATION: 94 % | TEMPERATURE: 97.9 F | BODY MASS INDEX: 28.86 KG/M2 | SYSTOLIC BLOOD PRESSURE: 136 MMHG | DIASTOLIC BLOOD PRESSURE: 63 MMHG

## 2021-02-25 LAB
ANION GAP SERPL CALCULATED.3IONS-SCNC: 8 MMOL/L (ref 7–16)
BACTERIA: ABNORMAL /HPF
BILIRUBIN URINE: NEGATIVE
BLOOD, URINE: NEGATIVE
BUN BLDV-MCNC: 15 MG/DL (ref 8–23)
CALCIUM SERPL-MCNC: 9.1 MG/DL (ref 8.6–10.2)
CHLORIDE BLD-SCNC: 103 MMOL/L (ref 98–107)
CLARITY: CLEAR
CO2: 28 MMOL/L (ref 22–29)
COLOR: YELLOW
CREAT SERPL-MCNC: 1 MG/DL (ref 0.7–1.2)
GFR AFRICAN AMERICAN: >60
GFR NON-AFRICAN AMERICAN: >60 ML/MIN/1.73
GLUCOSE BLD-MCNC: 90 MG/DL (ref 74–99)
GLUCOSE URINE: NEGATIVE MG/DL
HCT VFR BLD CALC: 39.5 % (ref 37–54)
HEMOGLOBIN: 13.4 G/DL (ref 12.5–16.5)
KETONES, URINE: ABNORMAL MG/DL
LEUKOCYTE ESTERASE, URINE: ABNORMAL
LV EF: 81 %
LVEF MODALITY: NORMAL
MCH RBC QN AUTO: 31.4 PG (ref 26–35)
MCHC RBC AUTO-ENTMCNC: 33.9 % (ref 32–34.5)
MCV RBC AUTO: 92.5 FL (ref 80–99.9)
NITRITE, URINE: NEGATIVE
PDW BLD-RTO: 15.9 FL (ref 11.5–15)
PH UA: 5.5 (ref 5–9)
PLATELET # BLD: 146 E9/L (ref 130–450)
PMV BLD AUTO: 10 FL (ref 7–12)
POTASSIUM SERPL-SCNC: 4.1 MMOL/L (ref 3.5–5)
PROTEIN UA: NEGATIVE MG/DL
RBC # BLD: 4.27 E12/L (ref 3.8–5.8)
RBC UA: ABNORMAL /HPF (ref 0–2)
SODIUM BLD-SCNC: 139 MMOL/L (ref 132–146)
SPECIFIC GRAVITY UA: 1.02 (ref 1–1.03)
UROBILINOGEN, URINE: 0.2 E.U./DL
WBC # BLD: 5.9 E9/L (ref 4.5–11.5)
WBC UA: ABNORMAL /HPF (ref 0–5)

## 2021-02-25 PROCEDURE — 93016 CV STRESS TEST SUPVJ ONLY: CPT | Performed by: INTERNAL MEDICINE

## 2021-02-25 PROCEDURE — 3430000000 HC RX DIAGNOSTIC RADIOPHARMACEUTICAL: Performed by: RADIOLOGY

## 2021-02-25 PROCEDURE — 81001 URINALYSIS AUTO W/SCOPE: CPT

## 2021-02-25 PROCEDURE — 93018 CV STRESS TEST I&R ONLY: CPT | Performed by: INTERNAL MEDICINE

## 2021-02-25 PROCEDURE — 80048 BASIC METABOLIC PNL TOTAL CA: CPT

## 2021-02-25 PROCEDURE — A9500 TC99M SESTAMIBI: HCPCS | Performed by: RADIOLOGY

## 2021-02-25 PROCEDURE — 85027 COMPLETE CBC AUTOMATED: CPT

## 2021-02-25 PROCEDURE — 36415 COLL VENOUS BLD VENIPUNCTURE: CPT

## 2021-02-25 PROCEDURE — 78452 HT MUSCLE IMAGE SPECT MULT: CPT | Performed by: INTERNAL MEDICINE

## 2021-02-25 PROCEDURE — 93017 CV STRESS TEST TRACING ONLY: CPT

## 2021-02-25 PROCEDURE — 78452 HT MUSCLE IMAGE SPECT MULT: CPT

## 2021-02-25 RX ORDER — CEPHALEXIN 500 MG/1
500 CAPSULE ORAL 2 TIMES DAILY
Qty: 20 CAPSULE | Refills: 0 | Status: SHIPPED | OUTPATIENT
Start: 2021-02-25 | End: 2021-08-31 | Stop reason: ALTCHOICE

## 2021-02-25 RX ORDER — NEOMYCIN SULFATE, POLYMYXIN B SULFATE, BACITRACIN ZINC, HYDROCORTISONE 3.5; 10000; 400; 1 MG/G; [USP'U]/G; [USP'U]/G; MG/G
OINTMENT OPHTHALMIC 2 TIMES DAILY
Qty: 1 TUBE | Refills: 1 | Status: SHIPPED | OUTPATIENT
Start: 2021-02-25 | End: 2021-03-07

## 2021-02-25 RX ADMIN — Medication 35 MILLICURIE: at 11:37

## 2021-02-25 RX ADMIN — Medication 12 MILLICURIE: at 08:39

## 2021-02-25 ASSESSMENT — PAIN SCALES - GENERAL
PAINLEVEL_OUTOF10: 0

## 2021-02-25 ASSESSMENT — ENCOUNTER SYMPTOMS
SHORTNESS OF BREATH: 0
ABDOMINAL PAIN: 0

## 2021-02-25 NOTE — DISCHARGE SUMMARY
Discharge Summary    Date: 2/25/2021  Patient Name: Blaine Alberto YOB: 1945 Age: 70 y.o. Admit Date: 2/23/2021  Discharge Date: 2/25/2021  Discharge Condition: Stable    Admission Diagnosis  Syncope and collapse (R55)     Discharge Diagnosis  Active Problems: Syncope and collapseResolved Problems: * No resolved hospital problems. [facility] Stay  Narrative of Hospital Course:  Patient admitted after a syncopal event. Plastic surgery repaired a laceration above his lip. EP saw patient and did stress test which was negative. Discharged to home. Consultants:  IP CONSULT TO PLASTIC SURGERYIP CONSULT TO INTERNAL MEDICINEIP CONSULT TO ELECTROPHYSIOLOGY    Surgeries/procedures Performed:       Treatments:           Discharge Plan/Disposition:  Home    Hospital/Incidental Findings Requiring Follow Up:    Patient Instructions:    Diet: Cardiac Diet    Activity:Activity as Tolerated  For number of days (if applicable): Other Instructions:    Provider Follow-Up:   No follow-ups on file.      Significant Diagnostic Studies:    Recent Labs:  Admission on 02/23/2021WBC                                         Date: 02/23/2021Value: 6.3         Ref range: 4.5 - 11.5 E9/L    Status: FinalRBC                                           Date: 02/23/2021Value: 4.26        Ref range: 3.80 - 5.80 E12/L  Status: FinalHemoglobin                                    Date: 02/23/2021Value: 12.9        Ref range: 12.5 - 16.5 g/dL   Status: FinalHematocrit                                    Date: 02/23/2021Value: 39.8        Ref range: 37.0 - 54.0 %      Status: FinalMCV                                           Date: 02/23/2021Value: 93.4        Ref range: 80.0 - 99.9 fL     Status: [address]                                           Date: 02/23/2021Value: 30.3        Ref range: 26.0 - 35.0 pg     Status: [address] [facility]                                          Date: 02/23/2021Value: 32.4        Ref range: 32.0 - 34.5 % Status: FinalRDW                                           Date: 02/23/2021Value: 15.8*       Ref range: 11.5 - 15.0 fL     Status: FinalPlatelets                                     Date: 02/23/2021Value: 143         Ref range: 130 - 450 E9/L     Status: FinalMPV                                           Date: 02/23/2021Value: 9.9         Ref range: 7.0 - 12.0 fL      Status: FinalNeutrophils %                                 Date: 02/23/2021Value: 71.4        Ref range: 43.0 - 80.0 %      Status: FinalImmature Granulocytes %                       Date: 02/23/2021Value: 0.5         Ref range: 0.0 - 5.0 %        Status: FinalLymphocytes %                                 Date: 02/23/2021Value: 21.2        Ref range: 20.0 - 42.0 %      Status: FinalMonocytes %                                   Date: 02/23/2021Value: 4.9         Ref range: 2.0 - 12.0 %       Status: FinalEosinophils %                                 Date: 02/23/2021Value: 0.9         Ref range: 0.0 - 6.0 %        Status: FinalBasophils %                                   Date: 02/23/2021Value: 1.1         Ref range: 0.0 - 2.0 %        Status: FinalNeutrophils Absolute                          Date: 02/23/2021Value: 4.51        Ref range: 1.80 - 7.30 E9/L   Status: FinalImmature Granulocytes #                       Date: 02/23/2021Value: 0.03        Ref range: E9/L               Status: FinalLymphocytes Absolute                          Date: 02/23/2021Value: 1.34*       Ref range: 1.50 - 4.00 E9/L   Status: FinalMonocytes Absolute                            Date: 02/23/2021Value: 0.31        Ref range: 0.10 - 0.95 E9/L   Status: FinalEosinophils Absolute                          Date: 02/23/2021Value: 0.06        Ref range: 0.05 - 0.50 E9/L   Status: FinalBasophils Absolute                            Date: 02/23/2021Value: 0.07        Ref range: 0.00 - 0.20 E9/L   Status: FinalSodium                                        Date: 02/23/2021Value: 138 Ref range: 132 - 146 mmol/L   Status: FinalPotassium                                     Date: 02/23/2021Value: 4.8         Ref range: 3.5 - 5.0 mmol/L   Status: Final              Comment: Specimen is moderately Hemolyzed. Result may be artificially increased. Chloride                                      Date: 02/23/2021Value: 103         Ref range: 98 - 107 mmol/L    Status: FinalCO2                                           Date: 02/23/2021Value: 22          Ref range: 22 - 29 mmol/L     Status: FinalAnion Gap                                     Date: 02/23/2021Value: 13          Ref range: 7 - 16 mmol/L      Status: FinalGlucose                                       Date: 02/23/2021Value: 149*        Ref range: 74 - 99 mg/dL      Status: FinalBUN                                           Date: 02/23/2021Value: 20          Ref range: 8 - 23 mg/dL       Status: FinalCREATININE                                    Date: 02/23/2021Value: 1.2         Ref range: 0.7 - 1.2 mg/dL    Status: FinalGFR Non-                      Date: 02/23/2021Value: 59          Ref range: >=60 mL/min/1.73   Status: Final              Comment: Chronic Kidney Disease: less than 60 ml/min/1.73 sq.m. Kidney Failure: less than 15 ml/min/1.73 sq. m. Results valid for patients 18 years and older. GFR                           Date: 02/23/2021Value: >60           Status: FinalCalcium                                       Date: 02/23/2021Value: 8.6         Ref range: 8.6 - 10.2 mg/dL   Status: FinalTotal Protein                                 Date: 02/23/2021Value: 6.2*        Ref range: 6.4 - 8.3 g/dL     Status: FinalAlbumin                                       Date: 02/23/2021Value: 3.9         Ref range: 3.5 - 5.2 g/dL     Status: FinalTotal Bilirubin                               Date: 02/23/2021Value: 0.8         Ref range: 0.0 - 1.2 mg/dL    Status: FinalAlkaline Phosphatase Date: 02/23/2021Value: 42          Ref range: 40 - 129 U/L       Status: FinalALT                                           Date: 02/23/2021Value: 25          Ref range: 0 - 40 U/L         Status: FinalAST                                           Date: 02/23/2021Value: 28          Ref range: 0 - 39 U/L         Status: Final              Comment: Specimen is moderately Hemolyzed. Result may be artificially increased. Troponin                                      Date: 02/23/2021Value: <0.01       Ref range: 0.00 - 0.03 ng/mL  Status: Final              Comment: SPECIMEN IS MODERATELY HEMOLYZED. Result may be artificially decreased. TROPONIN T BLOOD LEVELS:       0.03 ng/mL     Upper Reference Limit0. 04 - 0.09 ng/mL     Possible myocardial injury    >= 0.10 ng/mL     Myocardial injuryVentricular Rate                              Date: 02/23/2021Value: 67          Ref range: BPM                Status: FinalAtrial Rate                                   Date: 02/23/2021Value: 67          Ref range: BPM                Status: FinalP-R Interval                                  Date: 02/23/2021Value: 196         Ref range: ms                 Status: FinalQRS Duration                                  Date: 02/23/2021Value: 132         Ref range: ms                 Status: FinalQ-T Interval                                  Date: 02/23/2021Value: 434         Ref range: ms                 Status: FinalQTc Calculation (Bazett)                      Date: 02/23/2021Value: 458         Ref range: ms                 Status: FinalP Axis                                        Date: 02/23/2021Value: 42          Ref range: degrees            Status: FinalR Axis                                        Date: 02/23/2021Value: 59          Ref range: degrees            Status: FinalT Axis                                        Date: 02/23/2021Value: 43          Ref range: degrees            Status: FinalD-Dimer, Valeen Champ Date: 02/23/2021Value: 272         Ref range: ng/mL DDU          Status: Final              Comment: D-DIMER Interpretation:<  230  ng/mL (D-DU)  Indicates low probability for PE/DVT = 232  ng/mL (D-DU)  Upper Limit of Normal>= 4000 ng/mL (D-DU)  This level could suggest the presence                      of DIC. Clinical correlation may be                      helpful. Color, UA                                     Date: 02/25/2021Value: Yellow      Ref range: Straw/Yellow       Status: FinalClarity, UA                                   Date: 02/25/2021Value: Clear       Ref range: Clear              Status: FinalGlucose, Ur                                   Date: 02/25/2021Value: Negative    Ref range: Negative mg/dL     Status: FinalBilirubin Urine                               Date: 02/25/2021Value: Negative    Ref range: Negative           Status: FinalKetones, Urine                                Date: 02/25/2021Value: TRACE*      Ref range: Negative mg/dL     Status: FinalSpecific Gravity, UA                          Date: 02/25/2021Value: 1.020       Ref range: 1.005 - 1.030      Status: FinalBlood, Urine                                  Date: 02/25/2021Value: Negative    Ref range: Negative           Status: FinalpH, UA                                        Date: 02/25/2021Value: 5.5         Ref range: 5.0 - 9.0          Status: FinalProtein, UA                                   Date: 02/25/2021Value: Negative    Ref range: Negative mg/dL     Status: FinalUrobilinogen, Urine                           Date: 02/25/2021Value: 0.2         Ref range: <2.0 E.U./dL       Status: FinalNitrite, Urine                                Date: 02/25/2021Value: Negative    Ref range: Negative           Status: FinalLeukocyte Esterase, Urine                     Date: 02/25/2021Value: TRACE*      Ref range: Negative           Status: [address] [facility], [facility]A                                       Date: 02/25/2021Value: NONE        Ref range: 0 - 5 /HPF         Status: FinalRBC, UA                                       Date: 02/25/2021Value: NONE        Ref range: 0 - 2 /HPF         Status: FinalBacteria, UA                                  Date: 02/25/2021Value: NONE SEEN   Ref range: None Seen /HPF     Status: FinalMagnesium                                     Date: 02/23/2021Value: 1.7         Ref range: 1.6 - 2.6 mg/dL    Status: [address] [facility]                                           Date: 02/24/2021Value: 6.6         Ref range: 4.5 - 11.5 E9/L    Status: FinalRBC                                           Date: 02/24/2021Value: 4.23        Ref range: 3.80 - 5.80 E12/L  Status: FinalHemoglobin                                    Date: 02/24/2021Value: 13.2        Ref range: 12.5 - 16.5 g/dL   Status: FinalHematocrit                                    Date: 02/24/2021Value: 39.5        Ref range: 37.0 - 54.0 %      Status: FinalMCV                                           Date: 02/24/2021Value: 93.4        Ref range: 80.0 - 99.9 fL     Status: [address] [place]                                           Date: 02/24/2021Value: 31.2        Ref range: 26.0 - 35.0 pg     Status: [address]                                          Date: 02/24/2021Value: 33.4        Ref range: 32.0 - 34.5 %      Status: FinalRDW                                           Date: 02/24/2021Value: 16.0*       Ref range: 11.5 - 15.0 fL     Status: FinalPlatelets                                     Date: 02/24/2021Value: 143         Ref range: 130 - 450 E9/L     Status: FinalMPV                                           Date: 02/24/2021Value: 9.7         Ref range: 7.0 - 12.0 fL      Status: FinalSodium                                        Date: 02/24/2021Value: 140         Ref range: 132 - 146 mmol/L   Status: FinalPotassium                                     Date: 02/24/2021Value: 4.4         Ref range: 3.5 - 5.0 mmol/L   Status: FinalChloride Date: 02/24/2021Value: 104         Ref range: 98 - 107 mmol/L    Status: FinalCO2                                           Date: 02/24/2021Value: 29          Ref range: 22 - 29 mmol/L     Status: FinalAnion Gap                                     Date: 02/24/2021Value: 7           Ref range: 7 - 16 mmol/L      Status: FinalGlucose                                       Date: 02/24/2021Value: 107*        Ref range: 74 - 99 mg/dL      Status: FinalBUN                                           Date: 02/24/2021Value: 15          Ref range: 8 - 23 mg/dL       Status: FinalCREATININE                                    Date: 02/24/2021Value: 1.2         Ref range: 0.7 - 1.2 mg/dL    Status: FinalGFR Non-                      Date: 02/24/2021Value: 59          Ref range: >=60 mL/min/1.73   Status: Final              Comment: Chronic Kidney Disease: less than 60 ml/min/1.73 sq.m. Kidney Failure: less than 15 ml/min/1.73 sq. m. Results valid for patients 18 years and older. GFR                           Date: 02/24/2021Value: >60           Status: FinalCalcium                                       Date: 02/24/2021Value: 8.8         Ref range: 8.6 - 10.2 mg/dL   Status: [address] [facility]                                           Date: 02/25/2021Value: 5.9         Ref range: 4.5 - 11.5 E9/L    Status: FinalRBC                                           Date: 02/25/2021Value: 4.27        Ref range: 3.80 - 5.80 E12/L  Status: FinalHemoglobin                                    Date: 02/25/2021Value: 13.4        Ref range: 12.5 - 16.5 g/dL   Status: FinalHematocrit                                    Date: 02/25/2021Value: 39.5        Ref range: 37.0 - 54.0 %      Status: FinalMCV                                           Date: 02/25/2021Value: 92.5        Ref range: 80.0 - 99.9 fL     Status: [address]                                           Date: 02/25/2021Value: 31.4        Ref range: 26.0 - 35.0 pg Status: [address]                                          Date: 02/25/2021Value: 33.9        Ref range: 32.0 - 34.5 %      Status: FinalRDW                                           Date: 02/25/2021Value: 15.9*       Ref range: 11.5 - 15.0 fL     Status: FinalPlatelets                                     Date: 02/25/2021Value: 146         Ref range: 130 - 450 E9/L     Status: FinalMPV                                           Date: 02/25/2021Value: 10.0        Ref range: 7.0 - 12.0 fL      Status: FinalSodium                                        Date: 02/25/2021Value: 139         Ref range: 132 - 146 mmol/L   Status: FinalPotassium                                     Date: 02/25/2021Value: 4.1         Ref range: 3.5 - 5.0 mmol/L   Status: FinalChloride                                      Date: 02/25/2021Value: 103         Ref range: 98 - 107 mmol/L    Status: FinalCO2                                           Date: 02/25/2021Value: 28          Ref range: 22 - 29 mmol/L     Status: FinalAnion Gap                                     Date: 02/25/2021Value: 8           Ref range: 7 - 16 mmol/L      Status: FinalGlucose                                       Date: 02/25/2021Value: 90          Ref range: 74 - 99 mg/dL      Status: FinalBUN                                           Date: 02/25/2021Value: 15          Ref range: 8 - 23 mg/dL       Status: FinalCREATININE                                    Date: 02/25/2021Value: 1.0         Ref range: 0.7 - 1.2 mg/dL    Status: FinalGFR Non-                      Date: 02/25/2021Value: >60         Ref range: >=60 mL/min/1.73   Status: Final              Comment: Chronic Kidney Disease: less than 60 ml/min/1.73 sq.m. Kidney Failure: less than 15 ml/min/1.73 sq. m. Results valid for patients 18 years and older. GFR                           Date: 02/25/2021Value: >60           Status: FinalCalcium                                       Date: 02/25/2021Value: 9.1         Ref range: 8.6 - 10.2 mg/dL   Status: Final------------    Radiology last 7 days:  Ct Head Wo ContrastResult Date: 2/23/20211. There is no acute intracranial abnormality. Specifically, there is no intracranial hemorrhage. 2. Atrophy and periventricular leukomalacia,Ct Facial Bones Wo ContrastResult Date: 2/23/2021No acute traumatic injury of the facial bones. Ct Cervical Spine Wo ContrastResult Date: 2/23/20211. There is no acute compression fracture or subluxation of the cervical spine. 2. Multilevel degenerative disc and degenerative joint disease. Cta Pulmonary W ContrastResult Date: 2/23/2021No evidence of pulmonary embolism or significant acute pulmonary abnormality. Nm Cardiac Stress Test Nuclear ImagingResult Date: 2/25/2021The myocardial perfusion imaging demonstrates no fixed or reversible perfusion defects at rest or stress. No evidence of ischemia or myocardial infarction. Overall left ventricular systolic function was normal with no wall motion abnormalities. Low-risk stress myocardial perfusion scan.      [unfilled]    Discharge Medications    Current Discharge Medication ListSTART taking these medicationscephALEXin (KEFLEX) 500 MG capsuleTake 1 capsule by mouth 2 times dailyQty: 20 capsule Refills: 0neomycin-bacitracin-polymyxin-hydrocortisone (CORTISPORIN) 1 % ophthalmic ointmentPlace into both eyes 2 times daily for 10 daysQty: 1 Tube Refills: 1    Current Discharge Medication List    Current Discharge Medication ListCONTINUE these medications which have NOT CHANGEDaspirin 81 MG EC tabletTake 81 mg by mouth dailyatorvastatin (LIPITOR) 40 MG tabletTake 40 mg by mouth daily    Current Discharge Medication ListSTOP taking these medicationssimvastatin (ZOCOR) 10 MG tabletComments:Reason for Stopping:    Time Spent on Discharge:1E] minutes were spent in patient examination, evaluation, counseling as well as medication reconciliation, prescriptions for required medications, discharge plan, and follow up.     Electronically signed by Adela Blanchard MD on 2/25/21 at 6:41 PM EST

## 2021-02-25 NOTE — PROGRESS NOTES
Interim situation reviewed:    1. No recurrent symptoms  2. Nothing important on telemetry  3. Treadmill Nuc benign. 4. TTE pending    RECOMMENDATIONS  1. Send him home on prior meds  2. No restrictions  3.  I will call with FU (echo and office)    Dahiana Almaraz MD, [place]  Cardiac Electrophysiology  2/25/21   6:33 PM EST

## 2021-02-25 NOTE — PROGRESS NOTES
Progress Note  Date:2021       Room:650/6503-  Patient Name:Khanh Olson     YOB: 1945     Age:75 y.o. Patient says he feels well at this time. Subjective    Subjective:  Symptoms:  Stable. No shortness of breath or chest pain. Diet:  NPO. Activity level: Normal.    Pain:  He reports no pain. Review of Systems   Constitutional: Negative for activity change and fever. HENT: Negative for congestion. Respiratory: Negative for shortness of breath. Cardiovascular: Negative for chest pain. Gastrointestinal: Negative for abdominal pain. Genitourinary: Negative for difficulty urinating. Neurological: Negative for dizziness and syncope. Psychiatric/Behavioral: Negative for agitation. Objective         Vitals Last 24 Hours:  TEMPERATURE:  Temp  Av.5 °F (36.4 °C)  Min: 97 °F (36.1 °C)  Max: 98 °F (36.7 °C)  RESPIRATIONS RANGE: Resp  Avg: 15.8  Min: 13  Max: 18  PULSE OXIMETRY RANGE: SpO2  Av.6 %  Min: 95 %  Max: 97 %  PULSE RANGE: Pulse  Av.4  Min: 59  Max: 76  BLOOD PRESSURE RANGE: Systolic (74TTX), FNV:387 , Min:99 , LQK:347   ; Diastolic (38DDB), QIS:64, Min:54, Max:63    I/O (24Hr): Intake/Output Summary (Last 24 hours) at 2021 0634  Last data filed at 2021 0513  Gross per 24 hour   Intake 420 ml   Output 1975 ml   Net -1555 ml     Objective:  General Appearance:  Comfortable. Vital signs: (most recent): Blood pressure (!) 119/59, pulse 61, temperature 97.6 °F (36.4 °C), temperature source Axillary, resp. rate 16, height 5' 10\" (1.778 m), weight 201 lb 9.6 oz (91.4 kg), SpO2 97 %. No fever. Lungs:  Normal effort and normal respiratory rate. Breath sounds clear to auscultation. Heart: Normal rate. Regular rhythm.   S1 normal and S2 normal.      Labs/Imaging/Diagnostics    Labs:  CBC:  Recent Labs     21  1232 21  0613   WBC 6.3 6.6   RBC 4.26 4.23   HGB 12.9 13.2   HCT 39.8 39.5   MCV 93.4 93.4   RDW 15.8* 16.0*  143     CHEMISTRIES:  Recent Labs     02/23/21  1232 02/24/21  0613    140   K 4.8 4.4    104   CO2 22 29   BUN 20 15   CREATININE 1.2 1.2   GLUCOSE 149* 107*   MG 1.7  --      PT/INR:No results for input(s): PROTIME, INR in the last 72 hours. APTT:No results for input(s): APTT in the last 72 hours. LIVER PROFILE:  Recent Labs     02/23/21  1232   AST 28   ALT 25   BILITOT 0.8   ALKPHOS 42       Imaging Last 24 Hours:  Ct Head Wo Contrast    Result Date: 2/23/2021  EXAMINATION: CT OF THE HEAD WITHOUT CONTRAST  2/23/2021 2:25 pm TECHNIQUE: CT of the head was performed without the administration of intravenous contrast. Dose modulation, iterative reconstruction, and/or weight based adjustment of the mA/kV was utilized to reduce the radiation dose to as low as reasonably achievable. COMPARISON: None. HISTORY: ORDERING SYSTEM PROVIDED HISTORY: fall TECHNOLOGIST PROVIDED HISTORY: Reason for exam:->fall Has a \"code stroke\" or \"stroke alert\" been called? ->No Decision Support Exception->Emergency Medical Condition (MA) What reading provider will be dictating this exam?->CRC FINDINGS: BRAIN/VENTRICLES: There is no acute intracranial hemorrhage, mass effect or midline shift. No abnormal extra-axial fluid collection. The gray-white differentiation is maintained without evidence of an acute infarct. There is no evidence of hydrocephalus. The ventricles, cisterns and sulci are prominent consistent with atrophy. There is decreased attenuation within the periventricular white matter consistent with periventricular leukomalacia. ORBITS: The visualized portion of the orbits demonstrate no acute abnormality. SINUSES: The visualized paranasal sinuses and mastoid air cells demonstrate no acute abnormality. SOFT TISSUES/SKULL:  No acute abnormality of the visualized skull or soft tissues. 1. There is no acute intracranial abnormality. Specifically, there is no intracranial hemorrhage.  2. Atrophy and periventricular leukomalacia,    Ct Facial Bones Wo Contrast    Result Date: 2/23/2021  EXAMINATION: CT OF THE FACE WITHOUT CONTRAST  2/23/2021 2:25 pm TECHNIQUE: CT of the face was performed without the administration of intravenous contrast. Multiplanar reformatted images are provided for review. Dose modulation, iterative reconstruction, and/or weight based adjustment of the mA/kV was utilized to reduce the radiation dose to as low as reasonably achievable. COMPARISON: None HISTORY: ORDERING SYSTEM PROVIDED HISTORY: fall TECHNOLOGIST PROVIDED HISTORY: Reason for exam:->fall Decision Support Exception->Emergency Medical Condition (MA) What reading provider will be dictating this exam?->CRC FINDINGS: FACIAL BONES:  The maxilla, pterygoid plates and zygomatic arches are intact. The mandible is intact. The mandibular condyles are normally situated. The nasal bones and maxillary nasal processes are intact. ORBITS:  The globes appear intact. The extraocular muscles, optic nerve sheath complexes and lacrimal glands appear unremarkable. No retrobulbar hematoma or mass is seen. The orbital walls and rims are intact. SINUSES/MASTOIDS:  The paranasal sinuses and mastoid air cells are well aerated. No acute fracture is seen. SOFT TISSUES:  No appreciable facial soft tissue swelling is seen. No acute traumatic injury of the facial bones. Ct Cervical Spine Wo Contrast    Result Date: 2/23/2021  EXAMINATION: CT OF THE CERVICAL SPINE WITHOUT CONTRAST 2/23/2021 2:25 pm TECHNIQUE: CT of the cervical spine was performed without the administration of intravenous contrast. Multiplanar reformatted images are provided for review. Dose modulation, iterative reconstruction, and/or weight based adjustment of the mA/kV was utilized to reduce the radiation dose to as low as reasonably achievable. COMPARISON: None.  HISTORY: ORDERING SYSTEM PROVIDED HISTORY: fall TECHNOLOGIST PROVIDED HISTORY: Reason for exam:->fall Decision Support Exception->Emergency Medical Condition (MA) What reading provider will be dictating this exam?->CRC FINDINGS: BONES/ALIGNMENT: The ring of C1 is intact as is the dense. There is no compression fracture of the cervical spine. No jumped or perched facet is noted. Multilevel degenerative disc and degenerative joint disease is noted. The prevertebral soft tissues are unremarkable. The airway is widely patent. Images through the lung apices are negative for a pneumothorax. 1. There is no acute compression fracture or subluxation of the cervical spine. 2. Multilevel degenerative disc and degenerative joint disease. Cta Pulmonary W Contrast    Result Date: 2/23/2021  EXAMINATION: CTA OF THE CHEST 2/23/2021 2:25 pm TECHNIQUE: CTA of the chest was performed after the administration of intravenous contrast.  Multiplanar reformatted images are provided for review. MIP images are provided for review. Dose modulation, iterative reconstruction, and/or weight based adjustment of the mA/kV was utilized to reduce the radiation dose to as low as reasonably achievable. COMPARISON: None. HISTORY: ORDERING SYSTEM PROVIDED HISTORY: concern for pe TECHNOLOGIST PROVIDED HISTORY: Reason for exam:->concern for pe Decision Support Exception->Emergency Medical Condition (MA) What reading provider will be dictating this exam?->CRC FINDINGS: Pulmonary Arteries: Pulmonary arteries are adequately opacified for evaluation. No evidence of intraluminal filling defect to suggest pulmonary embolism. Main pulmonary artery is normal in caliber. Mediastinum: No evidence of mediastinal lymphadenopathy. There is a small calcified right and left hilar nodes. The heart and pericardium demonstrate no acute abnormality. There is no acute abnormality of the thoracic aorta. Lungs/pleura: There is a small amount of bibasilar atelectasis. There is no focal consolidation or pulmonary edema. No evidence of pleural effusion or pneumothorax. Upper Abdomen: Limited images of the upper abdomen are unremarkable. Soft Tissues/Bones: No acute bone or soft tissue abnormality. No evidence of pulmonary embolism or significant acute pulmonary abnormality. Assessment//Plan           Hospital Problems           Last Modified POA    Syncope and collapse 2/23/2021 Yes        Assessment:  (Syncope and collapse 2/23/2021 Yes   lip laceration  Hyperlipidemia  ). Plan:   (Appreciate EP input. For stress test and ECHO today. Likely home if normal.).        Electronically signed by Migdalia Etienne MD on 2/25/21 at 6:34 AM EST

## 2021-02-25 NOTE — PLAN OF CARE
Problem: Falls - Risk of:  Goal: Will remain free from falls  Description: Will remain free from falls  2/25/2021 0214 by Stan Orona RN  Outcome: Met This Shift     Problem: Falls - Risk of:  Goal: Absence of physical injury  Description: Absence of physical injury  2/25/2021 0214 by Stan Orona RN  Outcome: Met This Shift

## 2021-04-20 ENCOUNTER — HOSPITAL ENCOUNTER (OUTPATIENT)
Dept: NON INVASIVE DIAGNOSTICS | Age: 76
Discharge: HOME | End: 2021-04-20
Payer: MEDICARE

## 2021-04-20 LAB
LV EF: 63 %
LVEF MODALITY: NORMAL

## 2021-04-20 PROCEDURE — 93306 TTE W/DOPPLER COMPLETE: CPT

## 2021-08-25 ENCOUNTER — HOSPITAL ENCOUNTER (EMERGENCY)
Age: 76
Discharge: HOME | End: 2021-08-25
Attending: EMERGENCY MEDICINE
Payer: MEDICARE

## 2021-08-25 ENCOUNTER — APPOINTMENT (OUTPATIENT)
Dept: GENERAL RADIOLOGY | Age: 76
End: 2021-08-25
Payer: MEDICARE

## 2021-08-25 ENCOUNTER — APPOINTMENT (OUTPATIENT)
Dept: CT IMAGING | Age: 76
End: 2021-08-25
Payer: MEDICARE

## 2021-08-25 VITALS
HEART RATE: 55 BPM | OXYGEN SATURATION: 98 % | DIASTOLIC BLOOD PRESSURE: 65 MMHG | RESPIRATION RATE: 17 BRPM | BODY MASS INDEX: 28.63 KG/M2 | WEIGHT: 200 LBS | SYSTOLIC BLOOD PRESSURE: 110 MMHG | HEIGHT: 70 IN | TEMPERATURE: 98 F

## 2021-08-25 DIAGNOSIS — I44.0 FIRST DEGREE AV BLOCK: ICD-10-CM

## 2021-08-25 DIAGNOSIS — R55 SYNCOPE AND COLLAPSE: Primary | ICD-10-CM

## 2021-08-25 LAB
ALBUMIN SERPL-MCNC: 4.2 G/DL (ref 3.5–5.2)
ALP BLD-CCNC: 48 U/L (ref 40–129)
ALT SERPL-CCNC: 29 U/L (ref 0–40)
ANION GAP SERPL CALCULATED.3IONS-SCNC: 11 MMOL/L (ref 7–16)
AST SERPL-CCNC: 26 U/L (ref 0–39)
BASOPHILS ABSOLUTE: 0.06 E9/L (ref 0–0.2)
BASOPHILS RELATIVE PERCENT: 1 % (ref 0–2)
BILIRUB SERPL-MCNC: 0.8 MG/DL (ref 0–1.2)
BUN BLDV-MCNC: 18 MG/DL (ref 6–23)
CALCIUM SERPL-MCNC: 8.9 MG/DL (ref 8.6–10.2)
CHLORIDE BLD-SCNC: 102 MMOL/L (ref 98–107)
CO2: 25 MMOL/L (ref 22–29)
CREAT SERPL-MCNC: 1.2 MG/DL (ref 0.7–1.2)
EKG ATRIAL RATE: 45 BPM
EKG P AXIS: 79 DEGREES
EKG P-R INTERVAL: 250 MS
EKG Q-T INTERVAL: 494 MS
EKG QRS DURATION: 122 MS
EKG QTC CALCULATION (BAZETT): 427 MS
EKG R AXIS: 48 DEGREES
EKG T AXIS: 18 DEGREES
EKG VENTRICULAR RATE: 45 BPM
EOSINOPHILS ABSOLUTE: 0.05 E9/L (ref 0.05–0.5)
EOSINOPHILS RELATIVE PERCENT: 0.8 % (ref 0–6)
GFR AFRICAN AMERICAN: >60
GFR NON-AFRICAN AMERICAN: 59 ML/MIN/1.73
GLUCOSE BLD-MCNC: 117 MG/DL (ref 74–99)
HCT VFR BLD CALC: 37.9 % (ref 37–54)
HEMOGLOBIN: 12.7 G/DL (ref 12.5–16.5)
IMMATURE GRANULOCYTES #: 0.03 E9/L
IMMATURE GRANULOCYTES %: 0.5 % (ref 0–5)
LYMPHOCYTES ABSOLUTE: 1.32 E9/L (ref 1.5–4)
LYMPHOCYTES RELATIVE PERCENT: 21.5 % (ref 20–42)
MCH RBC QN AUTO: 30.4 PG (ref 26–35)
MCHC RBC AUTO-ENTMCNC: 33.5 % (ref 32–34.5)
MCV RBC AUTO: 90.7 FL (ref 80–99.9)
MONOCYTES ABSOLUTE: 0.28 E9/L (ref 0.1–0.95)
MONOCYTES RELATIVE PERCENT: 4.6 % (ref 2–12)
NEUTROPHILS ABSOLUTE: 4.41 E9/L (ref 1.8–7.3)
NEUTROPHILS RELATIVE PERCENT: 71.6 % (ref 43–80)
PDW BLD-RTO: 16 FL (ref 11.5–15)
PLATELET # BLD: 144 E9/L (ref 130–450)
PMV BLD AUTO: 9.7 FL (ref 7–12)
POTASSIUM REFLEX MAGNESIUM: 4.3 MMOL/L (ref 3.5–5)
RBC # BLD: 4.18 E12/L (ref 3.8–5.8)
SODIUM BLD-SCNC: 138 MMOL/L (ref 132–146)
TOTAL PROTEIN: 6.6 G/DL (ref 6.4–8.3)
TROPONIN, HIGH SENSITIVITY: 7 NG/L (ref 0–11)
WBC # BLD: 6.2 E9/L (ref 4.5–11.5)

## 2021-08-25 PROCEDURE — 99285 EMERGENCY DEPT VISIT HI MDM: CPT

## 2021-08-25 PROCEDURE — 70450 CT HEAD/BRAIN W/O DYE: CPT

## 2021-08-25 PROCEDURE — 85025 COMPLETE CBC W/AUTO DIFF WBC: CPT

## 2021-08-25 PROCEDURE — 84484 ASSAY OF TROPONIN QUANT: CPT

## 2021-08-25 PROCEDURE — 71045 X-RAY EXAM CHEST 1 VIEW: CPT

## 2021-08-25 PROCEDURE — 2580000003 HC RX 258: Performed by: STUDENT IN AN ORGANIZED HEALTH CARE EDUCATION/TRAINING PROGRAM

## 2021-08-25 PROCEDURE — 93010 ELECTROCARDIOGRAM REPORT: CPT | Performed by: INTERNAL MEDICINE

## 2021-08-25 PROCEDURE — 80053 COMPREHEN METABOLIC PANEL: CPT

## 2021-08-25 PROCEDURE — 93005 ELECTROCARDIOGRAM TRACING: CPT | Performed by: STUDENT IN AN ORGANIZED HEALTH CARE EDUCATION/TRAINING PROGRAM

## 2021-08-25 RX ORDER — 0.9 % SODIUM CHLORIDE 0.9 %
500 INTRAVENOUS SOLUTION INTRAVENOUS ONCE
Status: COMPLETED | OUTPATIENT
Start: 2021-08-25 | End: 2021-08-25

## 2021-08-25 RX ADMIN — SODIUM CHLORIDE 500 ML: 9 INJECTION, SOLUTION INTRAVENOUS at 13:13

## 2021-08-25 ASSESSMENT — ENCOUNTER SYMPTOMS
SHORTNESS OF BREATH: 0
ABDOMINAL PAIN: 0
CONSTIPATION: 0
NAUSEA: 0
SORE THROAT: 0
VOMITING: 0
COUGH: 0
DIARRHEA: 0
RHINORRHEA: 0

## 2021-08-25 NOTE — ED PROVIDER NOTES
Chief Complaint   Patient presents with    Loss of Consciousness     patient arrived following syncopal episode @ Urologist office. patient had syncopal episode 2/2021, was given atropine. HPI     Patient is a 78 y.o. male with a past medical history of prostate cancer and HLD who presents after a syncopal episode at his urologist office. Patient states that he was talking to his urologist about his increasing PSA when he started to feel upset, lightheaded, dizzy and then he passed out. Per EMS, urology states that the patient was pale, passed out and had some abnormal movements while patient was past out. Patient had urinary incontinence. Currently, denies having any fever, chills, headache, blurry vision, chest pain, shortness of breath, heart palpitations, nausea, vomiting, diarrhea, confusion, weakness, saddle anesthesia, urinary symptoms. When EMS arrived at office patient was bradycardic in the 50s. He was hypotensive 96/56 so he was given a NSB and BP improved to 102/60. On arrival to Ed, patient was remained hypotensive to 102/51 and bradycardic to 50. Of note, patient had a syncopal episode in 02/2021. He was bradycardic to 30s and hypotensive for which he was given atropine and improved. He was admitted for a cardiac workup. Stress test was negative and outpatient echo showed normal function with EF 60-65%. Review of Systems   Constitutional: Negative for chills, fatigue and fever. HENT: Negative for congestion, rhinorrhea and sore throat. Eyes: Negative for visual disturbance. Respiratory: Negative for cough and shortness of breath. Cardiovascular: Negative for chest pain, palpitations and leg swelling. Gastrointestinal: Negative for abdominal pain, constipation, diarrhea, nausea and vomiting. Genitourinary: Negative for difficulty urinating, dysuria, hematuria and urgency. Skin: Positive for pallor. Neurological: Positive for dizziness, syncope and light-headedness. Negative for facial asymmetry, speech difficulty, weakness, numbness and headaches. Abnormal movements   Psychiatric/Behavioral: Negative for confusion. All other systems reviewed and are negative. Physical Exam  Vitals reviewed. Constitutional:       General: He is not in acute distress. Appearance: Normal appearance. HENT:      Head: Normocephalic and atraumatic. Eyes:      Extraocular Movements: Extraocular movements intact. Conjunctiva/sclera: Conjunctivae normal.      Pupils: Pupils are equal, round, and reactive to light. Cardiovascular:      Rate and Rhythm: Regular rhythm. Bradycardia present. Heart sounds: Normal heart sounds. No murmur heard. No friction rub. No gallop. Pulmonary:      Breath sounds: Normal breath sounds. No wheezing, rhonchi or rales. Abdominal:      General: Abdomen is flat. Bowel sounds are normal.      Palpations: Abdomen is soft. Tenderness: There is no abdominal tenderness. There is no guarding or rebound. Skin:     General: Skin is warm and dry. Capillary Refill: Capillary refill takes less than 2 seconds. Neurological:      General: No focal deficit present. Mental Status: He is alert and oriented to person, place, and time. Mental status is at baseline. Cranial Nerves: No cranial nerve deficit. Sensory: No sensory deficit. Motor: No weakness. Procedures     Labs Reviewed   CBC WITH AUTO DIFFERENTIAL   COMPREHENSIVE METABOLIC PANEL W/ REFLEX TO MG FOR LOW K   TROPONIN     XR CHEST PORTABLE    (Results Pending)   CT HEAD WO CONTRAST    (Results Pending)     EKG #1:  I personally interpreted this EKG  Time:  1304    Rate: 45  Rhythm: Sinus. Interpretation: sinus bradycardia, RBBB, new 1st degree AV block, MO interval 250, prolonged QTc 494. MDM    Patient is a 78 y.o. male who presents after a syncopal episode. On arrival, patient was hypotensive 102/51 and bradycardic 50.  Patient was given 500cc NSB. CBC, CMP and troponin were wnl. CXR and CT head were negative for acute abnormalities. On reevaluation, patient was asymptomatic and HR improved to the 60s. Patient was able to ambulate 100 feet without feeling dizzy or lightheaded. Decision was made to discharge patient with close follow up with EPS. Plan was discussed with patient and he was agreeable.         --------------------------------------------- PAST HISTORY ---------------------------------------------  Past Medical History:  has a past medical history of Hyperlipidemia and Prostate cancer ([facility] [address] Past Surgical History:  has no past surgical history on file. Social History:  reports that he has never smoked. He has never used smokeless tobacco. He reports that he does not drink alcohol. Family History: family history is not on file. The patients home medications have been reviewed. Allergies: Patient has no known allergies.     -------------------------------------------------- RESULTS -------------------------------------------------  Labs:  Results for orders placed or performed during the hospital encounter of 08/25/21   CBC Auto Differential   Result Value Ref Range    WBC 6.2 4.5 - 11.5 E9/L    RBC 4.18 3.80 - 5.80 E12/L    Hemoglobin 12.7 12.5 - 16.5 g/dL    Hematocrit 37.9 37.0 - 54.0 %    MCV 90.7 80.0 - 99.9 fL    MCH 30.4 26.0 - 35.0 pg    MCHC 33.5 32.0 - 34.5 %    RDW 16.0 (H) 11.5 - 15.0 fL    Platelets 495 610 - 086 E9/L    MPV 9.7 7.0 - 12.0 fL    Neutrophils % 71.6 43.0 - 80.0 %    Immature Granulocytes % 0.5 0.0 - 5.0 %    Lymphocytes % 21.5 20.0 - 42.0 %    Monocytes % 4.6 2.0 - 12.0 %    Eosinophils % 0.8 0.0 - 6.0 %    Basophils % 1.0 0.0 - 2.0 %    Neutrophils Absolute 4.41 1.80 - 7.30 E9/L    Immature Granulocytes # 0.03 E9/L    Lymphocytes Absolute 1.32 (L) 1.50 - 4.00 E9/L    Monocytes Absolute 0.28 0.10 - 0.95 E9/L    Eosinophils Absolute 0.05 0.05 - 0.50 E9/L    Basophils Absolute 0.06 0.00 - 0.20 E9/L Comprehensive Metabolic Panel w/ Reflex to MG   Result Value Ref Range    Sodium 138 132 - 146 mmol/L    Potassium reflex Magnesium 4.3 3.5 - 5.0 mmol/L    Chloride 102 98 - 107 mmol/L    CO2 25 22 - 29 mmol/L    Anion Gap 11 7 - 16 mmol/L    Glucose 117 (H) 74 - 99 mg/dL    BUN 18 6 - 23 mg/dL    CREATININE 1.2 0.7 - 1.2 mg/dL    GFR Non-African American 59 >=60 mL/min/1.73    GFR African American >60     Calcium 8.9 8.6 - 10.2 mg/dL    Total Protein 6.6 6.4 - 8.3 g/dL    Albumin 4.2 3.5 - 5.2 g/dL    Total Bilirubin 0.8 0.0 - 1.2 mg/dL    Alkaline Phosphatase 48 40 - 129 U/L    ALT 29 0 - 40 U/L    AST 26 0 - 39 U/L   Troponin   Result Value Ref Range    Troponin, High Sensitivity 7 0 - 11 ng/L   EKG 12 Lead   Result Value Ref Range    Ventricular Rate 45 BPM    Atrial Rate 45 BPM    P-R Interval 250 ms    QRS Duration 122 ms    Q-T Interval 494 ms    QTc Calculation (Bazett) 427 ms    P Axis 79 degrees    R Axis 48 degrees    T Axis 18 degrees       Radiology:  CT HEAD WO CONTRAST   Final Result   No acute intracranial abnormality. XR CHEST PORTABLE   Final Result   No acute process. ------------------------- NURSING NOTES AND VITALS REVIEWED ---------------------------  Date / Time Roomed:  8/25/2021 12:44 PM  ED Bed Assignment:  [facility]/    The nursing notes within the ED encounter and vital signs as below have been reviewed. /65   Pulse 55   Temp 98 °F (36.7 °C)   Resp 17   Ht 5' 10\" (1.778 m)   Wt 200 lb (90.7 kg)   SpO2 98%   BMI 28.70 kg/m²   Oxygen Saturation Interpretation: Normal      ------------------------------------------ PROGRESS NOTES ------------------------------------------  I have spoken with the patient and wife and discussed todays results, in addition to providing specific details for the plan of care and counseling regarding the diagnosis and prognosis. Their questions are answered at this time and they are agreeable with the plan.  I discussed at length with them reasons for immediate return here for re evaluation. They will followup with primary care by calling their office tomorrow. --------------------------------- ADDITIONAL PROVIDER NOTES ---------------------------------  At this time the patient is without objective evidence of an acute process requiring hospitalization or inpatient management. They have remained hemodynamically stable throughout their entire ED visit and are stable for discharge with outpatient follow-up. The plan has been discussed in detail and they are aware of the specific conditions for emergent return, as well as the importance of follow-up. Discharge Medication List as of 8/25/2021  3:07 PM          Diagnosis:  1. Syncope and collapse    2. First degree AV block        Disposition:  Patient's disposition: Discharge to home  Patient's condition is stable.        Patricia Holman MD  Resident  08/26/21 7300

## 2021-08-27 ENCOUNTER — TELEPHONE (OUTPATIENT)
Dept: ADMINISTRATIVE | Age: 76
End: 2021-08-27

## 2021-08-27 NOTE — TELEPHONE ENCOUNTER
Pt went to  -[facility] 8/25- would like to make an appt with Dr Terence Marie. He did see him in [facility] 2/25/21. No device.  [phone]

## 2021-08-31 ENCOUNTER — OFFICE VISIT (OUTPATIENT)
Dept: NON INVASIVE DIAGNOSTICS | Age: 76
End: 2021-08-31
Payer: MEDICARE

## 2021-08-31 VITALS
BODY MASS INDEX: 28.95 KG/M2 | HEIGHT: 70 IN | HEART RATE: 71 BPM | WEIGHT: 202.2 LBS | DIASTOLIC BLOOD PRESSURE: 64 MMHG | RESPIRATION RATE: 18 BRPM | SYSTOLIC BLOOD PRESSURE: 116 MMHG

## 2021-08-31 DIAGNOSIS — R55 SYNCOPE AND COLLAPSE: Primary | ICD-10-CM

## 2021-08-31 PROCEDURE — 99214 OFFICE O/P EST MOD 30 MIN: CPT | Performed by: SPECIALIST

## 2021-08-31 PROCEDURE — 93000 ELECTROCARDIOGRAM COMPLETE: CPT | Performed by: SPECIALIST

## 2021-08-31 RX ORDER — AMOXICILLIN 500 MG/1
TABLET, FILM COATED ORAL
COMMUNITY
Start: 2021-08-05 | End: 2021-08-31 | Stop reason: ALTCHOICE

## 2021-08-31 NOTE — PROGRESS NOTES
Isabel Thurston paid his follow up visit to the [facility]) Cardiac Electrophysiology office on 8/31/21. INTERIM SYMPTOMS  1. Angina or equivalent: no  2. Dyspnea: no  3. Syncope: yes (see below)  4. Lightheadedness: no  5. Palpitation: no  6. Fatigue: no    7. Other: no    SYNOPSIS  1. Dysautonomia, with syncopal and near-syncopal events since childhood (low burden). LOC events 2/23/21 and 8/25/21, both of which were likely neurogenic (the latter more clearly than the former). Apart from these, he has been symptom-free. 2. Sinus node somnolence. 3. Right bundle branch block. Normal frontal plane axis. 4. Borderline first degree AV block. 5. Atrial ectopy: likely low burden. 6. Ventricular ectopy: likely low burden. 7. Preserved cardiac mechanical function.       THOUGHTS  1. I am not thrilled by the fact that he's had 2 events closely spaced (6 months), prior to which his neurogenic tendency was largely quiescent. Nonetheless, I am not at this point convinced that cardiac pacing is warranted. If he has another event in the near future, I would have to reconsider this. MY PLAN  1. Wait and watch. No need for routine office followup at this point - he knows how to find me. Livier Wallace MD, [facility]      A total of 40 minutes were spent in preparation for the clinic visit, reviewing records/tests, counseling/education of the patient, ordering medications/tests/procedures, coordinating care, and documenting clinical information in the EHR.

## 2021-08-31 NOTE — LETTER
Jude Lazo paid his follow up visit to the [facility] Cardiac Electrophysiology office on 8/31/21. INTERIM SYMPTOMS  1. Angina or equivalent: no  2. Dyspnea: no  3. Syncope: yes (see below)  4. Lightheadedness: no  5. Palpitation: no  6. Fatigue: no    7. Other: no    SYNOPSIS  1. Dysautonomia, with syncopal and near-syncopal events since childhood (low burden). LOC events 2/23/21 and 8/25/21, both of which were likely neurogenic (the latter more clearly than the former). Apart from these, he has been symptom-free. 2. Sinus node somnolence. 3. Right bundle branch block. Normal frontal plane axis. 4. Borderline first degree AV block. 5. Atrial ectopy: likely low burden. 6. Ventricular ectopy: likely low burden. 7. Preserved cardiac mechanical function.       THOUGHTS  1. I am not thrilled by the fact that he's had 2 events closely spaced (6 months), prior to which his neurogenic tendency was largely quiescent. Nonetheless, I am not at this point convinced that cardiac pacing is warranted. If he has another event in the near future, I would have to reconsider this. MY PLAN  1. Wait and watch. No need for routine office followup at this point - he knows how to find me.     Mandeep Sifuentes MD, [facility]

## 2021-09-08 ENCOUNTER — HOSPITAL ENCOUNTER (OUTPATIENT)
Dept: PET IMAGING | Age: 76
Discharge: HOME | End: 2021-09-10
Payer: MEDICARE

## 2021-09-08 DIAGNOSIS — C61 PROSTATE CANCER (HCC): ICD-10-CM

## 2021-09-08 DIAGNOSIS — R97.21 RISING PSA FOLLOWING TREATMENT FOR MALIGNANT NEOPLASM OF PROSTATE: ICD-10-CM

## 2021-09-08 PROCEDURE — A9588 FLUCICLOVINE F-18: HCPCS | Performed by: RADIOLOGY

## 2021-09-08 PROCEDURE — 78815 PET IMAGE W/CT SKULL-THIGH: CPT

## 2021-09-08 PROCEDURE — 3430000000 HC RX DIAGNOSTIC RADIOPHARMACEUTICAL: Performed by: RADIOLOGY

## 2021-09-08 RX ADMIN — FLUCICLOVINE F-18 10 MILLICURIE: 221 INJECTION, SOLUTION INTRAVENOUS at 12:46

## 2021-10-20 NOTE — PROCEDURES
Exercise stress report    Indication: Syncope    Resting blood pressure: 134/62    Resting heart rate: 80    Target heart rate: 125    Peak exercise heart rate: 130    Peak exercise blood pressure: 170/92    Symptoms: None    Reason for termination: Target heart rate achieved and fatigue    Protocol: He exercised for 8 minutes on a Db protocol    Mets: 10    [facility] treadmill score: 8    Resting ECG: Sinus rhythm, 80 bpm.  Rightward axis. Right bundle branch block with baseline ST depressions and T wave inversions in V1 through V3 consistent with a right bundle branch block. Stress ECG: Sinus tachycardia. No dysrhythmias. No ST-T wave changes for ischemia. Assessment:  1. Good exercise capacity  2. Normal hemodynamic response to exercise  3. Negative stress ECG for ischemia. 4. Low risk exercise stress ECG test.  5. Perfusion imaging pending. left upper arm

## 2025-07-21 ENCOUNTER — HOSPITAL ENCOUNTER (OUTPATIENT)
Age: 80
Discharge: HOME | End: 2025-07-23

## 2025-07-21 PROCEDURE — 88305 TISSUE EXAM BY PATHOLOGIST: CPT

## 2025-07-24 LAB — SURGICAL PATHOLOGY REPORT: NORMAL
